# Patient Record
Sex: FEMALE | Race: WHITE | HISPANIC OR LATINO | Employment: OTHER | URBAN - METROPOLITAN AREA
[De-identification: names, ages, dates, MRNs, and addresses within clinical notes are randomized per-mention and may not be internally consistent; named-entity substitution may affect disease eponyms.]

---

## 2024-08-14 ENCOUNTER — HOSPITAL ENCOUNTER (EMERGENCY)
Facility: HOSPITAL | Age: 79
Discharge: HOME | End: 2024-08-14
Attending: STUDENT IN AN ORGANIZED HEALTH CARE EDUCATION/TRAINING PROGRAM
Payer: MEDICARE

## 2024-08-14 ENCOUNTER — APPOINTMENT (OUTPATIENT)
Dept: CARDIOLOGY | Facility: HOSPITAL | Age: 79
End: 2024-08-14
Payer: MEDICARE

## 2024-08-14 ENCOUNTER — APPOINTMENT (OUTPATIENT)
Dept: RADIOLOGY | Facility: HOSPITAL | Age: 79
End: 2024-08-14
Payer: MEDICARE

## 2024-08-14 VITALS
DIASTOLIC BLOOD PRESSURE: 66 MMHG | RESPIRATION RATE: 18 BRPM | WEIGHT: 131 LBS | OXYGEN SATURATION: 97 % | HEIGHT: 62 IN | BODY MASS INDEX: 24.11 KG/M2 | HEART RATE: 90 BPM | SYSTOLIC BLOOD PRESSURE: 129 MMHG | TEMPERATURE: 98.3 F

## 2024-08-14 DIAGNOSIS — Z04.1 EXAM FOLLOWING MVC (MOTOR VEHICLE COLLISION), NO APPARENT INJURY: Primary | ICD-10-CM

## 2024-08-14 LAB
ALBUMIN SERPL BCP-MCNC: 3.9 G/DL (ref 3.4–5)
ALP SERPL-CCNC: 104 U/L (ref 33–136)
ALT SERPL W P-5'-P-CCNC: 19 U/L (ref 7–45)
ANION GAP SERPL CALC-SCNC: 14 MMOL/L (ref 10–20)
AST SERPL W P-5'-P-CCNC: 22 U/L (ref 9–39)
BASOPHILS # BLD AUTO: 0.05 X10*3/UL (ref 0–0.1)
BASOPHILS NFR BLD AUTO: 0.7 %
BILIRUB SERPL-MCNC: 0.6 MG/DL (ref 0–1.2)
BUN SERPL-MCNC: 25 MG/DL (ref 6–23)
CALCIUM SERPL-MCNC: 9.1 MG/DL (ref 8.6–10.3)
CARDIAC TROPONIN I PNL SERPL HS: 5 NG/L (ref 0–13)
CHLORIDE SERPL-SCNC: 105 MMOL/L (ref 98–107)
CO2 SERPL-SCNC: 25 MMOL/L (ref 21–32)
CREAT SERPL-MCNC: 1.19 MG/DL (ref 0.5–1.05)
EGFRCR SERPLBLD CKD-EPI 2021: 47 ML/MIN/1.73M*2
EOSINOPHIL # BLD AUTO: 0.22 X10*3/UL (ref 0–0.4)
EOSINOPHIL NFR BLD AUTO: 3 %
ERYTHROCYTE [DISTWIDTH] IN BLOOD BY AUTOMATED COUNT: 12.4 % (ref 11.5–14.5)
GLUCOSE SERPL-MCNC: 215 MG/DL (ref 74–99)
HCT VFR BLD AUTO: 40.7 % (ref 36–46)
HGB BLD-MCNC: 13.9 G/DL (ref 12–16)
IMM GRANULOCYTES # BLD AUTO: 0.04 X10*3/UL (ref 0–0.5)
IMM GRANULOCYTES NFR BLD AUTO: 0.6 % (ref 0–0.9)
LYMPHOCYTES # BLD AUTO: 2.57 X10*3/UL (ref 0.8–3)
LYMPHOCYTES NFR BLD AUTO: 35.4 %
MCH RBC QN AUTO: 29.6 PG (ref 26–34)
MCHC RBC AUTO-ENTMCNC: 34.2 G/DL (ref 32–36)
MCV RBC AUTO: 87 FL (ref 80–100)
MONOCYTES # BLD AUTO: 0.56 X10*3/UL (ref 0.05–0.8)
MONOCYTES NFR BLD AUTO: 7.7 %
NEUTROPHILS # BLD AUTO: 3.81 X10*3/UL (ref 1.6–5.5)
NEUTROPHILS NFR BLD AUTO: 52.6 %
NRBC BLD-RTO: 0 /100 WBCS (ref 0–0)
PLATELET # BLD AUTO: 237 X10*3/UL (ref 150–450)
POTASSIUM SERPL-SCNC: 3.9 MMOL/L (ref 3.5–5.3)
PROT SERPL-MCNC: 6.6 G/DL (ref 6.4–8.2)
RBC # BLD AUTO: 4.69 X10*6/UL (ref 4–5.2)
SODIUM SERPL-SCNC: 140 MMOL/L (ref 136–145)
WBC # BLD AUTO: 7.3 X10*3/UL (ref 4.4–11.3)

## 2024-08-14 PROCEDURE — 80053 COMPREHEN METABOLIC PANEL: CPT | Performed by: STUDENT IN AN ORGANIZED HEALTH CARE EDUCATION/TRAINING PROGRAM

## 2024-08-14 PROCEDURE — 84484 ASSAY OF TROPONIN QUANT: CPT | Performed by: STUDENT IN AN ORGANIZED HEALTH CARE EDUCATION/TRAINING PROGRAM

## 2024-08-14 PROCEDURE — 72131 CT LUMBAR SPINE W/O DYE: CPT | Mod: RCN

## 2024-08-14 PROCEDURE — 71045 X-RAY EXAM CHEST 1 VIEW: CPT | Performed by: RADIOLOGY

## 2024-08-14 PROCEDURE — 85025 COMPLETE CBC W/AUTO DIFF WBC: CPT | Performed by: STUDENT IN AN ORGANIZED HEALTH CARE EDUCATION/TRAINING PROGRAM

## 2024-08-14 PROCEDURE — 72125 CT NECK SPINE W/O DYE: CPT

## 2024-08-14 PROCEDURE — 72128 CT CHEST SPINE W/O DYE: CPT | Mod: RCN

## 2024-08-14 PROCEDURE — 93005 ELECTROCARDIOGRAM TRACING: CPT

## 2024-08-14 PROCEDURE — 74176 CT ABD & PELVIS W/O CONTRAST: CPT

## 2024-08-14 PROCEDURE — 36415 COLL VENOUS BLD VENIPUNCTURE: CPT | Performed by: STUDENT IN AN ORGANIZED HEALTH CARE EDUCATION/TRAINING PROGRAM

## 2024-08-14 PROCEDURE — 99285 EMERGENCY DEPT VISIT HI MDM: CPT | Mod: 25

## 2024-08-14 PROCEDURE — 70450 CT HEAD/BRAIN W/O DYE: CPT

## 2024-08-14 PROCEDURE — 71045 X-RAY EXAM CHEST 1 VIEW: CPT

## 2024-08-14 RX ORDER — ACETAMINOPHEN 325 MG/1
975 TABLET ORAL ONCE
Status: COMPLETED | OUTPATIENT
Start: 2024-08-14 | End: 2024-08-14

## 2024-08-14 ASSESSMENT — LIFESTYLE VARIABLES
EVER FELT BAD OR GUILTY ABOUT YOUR DRINKING: NO
TOTAL SCORE: 0
HAVE PEOPLE ANNOYED YOU BY CRITICIZING YOUR DRINKING: NO
HAVE YOU EVER FELT YOU SHOULD CUT DOWN ON YOUR DRINKING: NO
EVER HAD A DRINK FIRST THING IN THE MORNING TO STEADY YOUR NERVES TO GET RID OF A HANGOVER: NO

## 2024-08-14 ASSESSMENT — COLUMBIA-SUICIDE SEVERITY RATING SCALE - C-SSRS
6. HAVE YOU EVER DONE ANYTHING, STARTED TO DO ANYTHING, OR PREPARED TO DO ANYTHING TO END YOUR LIFE?: NO
2. HAVE YOU ACTUALLY HAD ANY THOUGHTS OF KILLING YOURSELF?: NO
1. IN THE PAST MONTH, HAVE YOU WISHED YOU WERE DEAD OR WISHED YOU COULD GO TO SLEEP AND NOT WAKE UP?: NO

## 2024-08-14 ASSESSMENT — PAIN SCALES - GENERAL: PAINLEVEL_OUTOF10: 10 - WORST POSSIBLE PAIN

## 2024-08-14 ASSESSMENT — PAIN - FUNCTIONAL ASSESSMENT: PAIN_FUNCTIONAL_ASSESSMENT: 0-10
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
## 2024-08-15 LAB
ATRIAL RATE: 84 BPM
P AXIS: 19 DEGREES
P OFFSET: 174 MS
P ONSET: 136 MS
PR INTERVAL: 154 MS
Q ONSET: 213 MS
QRS COUNT: 14 BEATS
QRS DURATION: 116 MS
QT INTERVAL: 420 MS
QTC CALCULATION(BAZETT): 496 MS
QTC FREDERICIA: 469 MS
R AXIS: -70 DEGREES
T AXIS: 35 DEGREES
T OFFSET: 423 MS
VENTRICULAR RATE: 84 BPM

## 2024-09-03 ENCOUNTER — APPOINTMENT (OUTPATIENT)
Dept: PRIMARY CARE | Facility: CLINIC | Age: 79
End: 2024-09-03
Payer: MEDICARE

## 2024-09-04 ENCOUNTER — APPOINTMENT (OUTPATIENT)
Dept: PRIMARY CARE | Facility: CLINIC | Age: 79
End: 2024-09-04
Payer: MEDICARE

## 2024-09-04 VITALS
DIASTOLIC BLOOD PRESSURE: 74 MMHG | RESPIRATION RATE: 16 BRPM | OXYGEN SATURATION: 92 % | SYSTOLIC BLOOD PRESSURE: 126 MMHG | HEART RATE: 56 BPM | BODY MASS INDEX: 24.22 KG/M2 | HEIGHT: 62 IN | TEMPERATURE: 98 F | WEIGHT: 131.6 LBS

## 2024-09-04 DIAGNOSIS — I73.9 CLAUDICATION OF BOTH LOWER EXTREMITIES (CMS-HCC): ICD-10-CM

## 2024-09-04 DIAGNOSIS — I10 PRIMARY HYPERTENSION: ICD-10-CM

## 2024-09-04 DIAGNOSIS — Z00.00 ROUTINE GENERAL MEDICAL EXAMINATION AT HEALTH CARE FACILITY: ICD-10-CM

## 2024-09-04 DIAGNOSIS — Z78.0 MENOPAUSE: ICD-10-CM

## 2024-09-04 DIAGNOSIS — E03.8 OTHER SPECIFIED HYPOTHYROIDISM: ICD-10-CM

## 2024-09-04 DIAGNOSIS — N18.31 STAGE 3A CHRONIC KIDNEY DISEASE (MULTI): ICD-10-CM

## 2024-09-04 DIAGNOSIS — Z00.00 ENCOUNTER FOR SUBSEQUENT ANNUAL WELLNESS VISIT IN MEDICARE PATIENT: ICD-10-CM

## 2024-09-04 DIAGNOSIS — K21.00 GASTROESOPHAGEAL REFLUX DISEASE WITH ESOPHAGITIS, UNSPECIFIED WHETHER HEMORRHAGE: ICD-10-CM

## 2024-09-04 DIAGNOSIS — E78.2 MIXED HYPERLIPIDEMIA: ICD-10-CM

## 2024-09-04 DIAGNOSIS — E11.9 TYPE 2 DIABETES MELLITUS WITHOUT COMPLICATION, WITHOUT LONG-TERM CURRENT USE OF INSULIN (MULTI): Primary | ICD-10-CM

## 2024-09-04 PROCEDURE — 1036F TOBACCO NON-USER: CPT | Performed by: FAMILY MEDICINE

## 2024-09-04 PROCEDURE — 99204 OFFICE O/P NEW MOD 45 MIN: CPT | Performed by: FAMILY MEDICINE

## 2024-09-04 PROCEDURE — 1123F ACP DISCUSS/DSCN MKR DOCD: CPT | Performed by: FAMILY MEDICINE

## 2024-09-04 PROCEDURE — G0439 PPPS, SUBSEQ VISIT: HCPCS | Performed by: FAMILY MEDICINE

## 2024-09-04 PROCEDURE — 3078F DIAST BP <80 MM HG: CPT | Performed by: FAMILY MEDICINE

## 2024-09-04 PROCEDURE — 1158F ADVNC CARE PLAN TLK DOCD: CPT | Performed by: FAMILY MEDICINE

## 2024-09-04 PROCEDURE — 3074F SYST BP LT 130 MM HG: CPT | Performed by: FAMILY MEDICINE

## 2024-09-04 PROCEDURE — 1170F FXNL STATUS ASSESSED: CPT | Performed by: FAMILY MEDICINE

## 2024-09-04 PROCEDURE — 1159F MED LIST DOCD IN RCRD: CPT | Performed by: FAMILY MEDICINE

## 2024-09-04 RX ORDER — LEVOTHYROXINE SODIUM 50 UG/1
50 TABLET ORAL DAILY
COMMUNITY
Start: 2024-06-21 | End: 2024-09-04 | Stop reason: SDUPTHER

## 2024-09-04 RX ORDER — FAMOTIDINE 40 MG/1
1 TABLET, FILM COATED ORAL DAILY
COMMUNITY
Start: 2024-06-21 | End: 2024-09-04 | Stop reason: SDUPTHER

## 2024-09-04 RX ORDER — TRIAMTERENE AND HYDROCHLOROTHIAZIDE 37.5; 25 MG/1; MG/1
1 CAPSULE ORAL
Qty: 90 CAPSULE | Refills: 3 | Status: SHIPPED | OUTPATIENT
Start: 2024-09-04

## 2024-09-04 RX ORDER — LEVOTHYROXINE SODIUM 50 UG/1
50 TABLET ORAL DAILY
Qty: 90 TABLET | Refills: 3 | Status: SHIPPED | OUTPATIENT
Start: 2024-09-04

## 2024-09-04 RX ORDER — ATORVASTATIN CALCIUM 40 MG/1
40 TABLET, FILM COATED ORAL
COMMUNITY
Start: 2024-06-21 | End: 2024-09-04 | Stop reason: SDUPTHER

## 2024-09-04 RX ORDER — FAMOTIDINE 40 MG/1
40 TABLET, FILM COATED ORAL DAILY
Qty: 90 TABLET | Refills: 3 | Status: SHIPPED | OUTPATIENT
Start: 2024-09-04

## 2024-09-04 RX ORDER — TRIAMTERENE AND HYDROCHLOROTHIAZIDE 37.5; 25 MG/1; MG/1
1 CAPSULE ORAL
COMMUNITY
Start: 2024-06-21 | End: 2024-09-04 | Stop reason: SDUPTHER

## 2024-09-04 RX ORDER — GLIMEPIRIDE 4 MG/1
4 TABLET ORAL
Qty: 90 TABLET | Refills: 3 | Status: SHIPPED | OUTPATIENT
Start: 2024-09-04 | End: 2025-08-30

## 2024-09-04 RX ORDER — ATORVASTATIN CALCIUM 40 MG/1
40 TABLET, FILM COATED ORAL
Qty: 90 TABLET | Refills: 3 | Status: SHIPPED | OUTPATIENT
Start: 2024-09-04

## 2024-09-04 ASSESSMENT — ACTIVITIES OF DAILY LIVING (ADL)
GROCERY_SHOPPING: INDEPENDENT
MANAGING_FINANCES: INDEPENDENT
BATHING: INDEPENDENT
DOING_HOUSEWORK: INDEPENDENT
TAKING_MEDICATION: INDEPENDENT
DRESSING: INDEPENDENT

## 2024-09-04 ASSESSMENT — PATIENT HEALTH QUESTIONNAIRE - PHQ9
2. FEELING DOWN, DEPRESSED OR HOPELESS: NOT AT ALL
1. LITTLE INTEREST OR PLEASURE IN DOING THINGS: NOT AT ALL
SUM OF ALL RESPONSES TO PHQ9 QUESTIONS 1 AND 2: 0

## 2024-09-04 ASSESSMENT — ENCOUNTER SYMPTOMS
LOSS OF SENSATION IN FEET: 0
OCCASIONAL FEELINGS OF UNSTEADINESS: 0
DEPRESSION: 0

## 2024-09-04 NOTE — PROGRESS NOTES
"Subjective    Patient ID: Akanksha Ramirez is a 78 y.o. female who presents for Medicare Annual Wellness Visit Subsequent, Diabetes (Pt would like to try Glipezide or Glyburide ), Hypertension, Hyperlipidemia, Hypothyroidism, Leg Pain (Bilateral in the back of her legs. Pt takes otc Ibuprofen), and Headache (Pain on the right side of her head only.).     Diabetes: HbA1C was 8.0 back in June 2024. Patient is currently not on any diabetic medication. She had an adverse reaction to a previous medication, which led to a 3-day hospital admission. Will prescribe glimepiride 4mg 1 tablet in the morning and continue to monitor blood sugars at home. Home blood sugars range between 212-282.     Hypertension: Blood pressure reading today is normal.     Hyperlipidemia: Recommended to continue atorvastatin.     Leg pain: Patient complaints of bilateral leg pain which worsens with activity.  It improves with rest.  She also takes ibuprofen for this. She reports having varicose veins. A referral to vascular specialist will be placed.      The patients living will is non-active. Her POA is daughter, back-up POA is Unknown at this time.  The patients current code status is DNR. The patient does not want to linger on machines. .    The patient denies having the following symptoms: chest pain, chest pressure, fever, chills, N/V/D, constipation, dizziness, headaches, SOB.    Objective   Vitals:  /74   Pulse 56   Temp 36.7 °C (98 °F)   Resp 16   Ht 1.575 m (5' 2\")   Wt 59.7 kg (131 lb 9.6 oz)   SpO2 92%   BMI 24.07 kg/m²     Physical Exam  Vitals reviewed.   Constitutional:       Appearance: Normal appearance.   Neck:      Vascular: No carotid bruit.   Cardiovascular:      Rate and Rhythm: Normal rate and regular rhythm.      Heart sounds: Normal heart sounds.      Comments: Radial pulses are strong and symmetric. Ankle pulses markedly diminished.   Pulmonary:      Effort: Pulmonary effort is normal. No respiratory " distress.      Breath sounds: Normal breath sounds. No wheezing.   Abdominal:      General: There is no distension.      Palpations: Abdomen is soft. There is no mass.      Tenderness: There is no abdominal tenderness. There is no right CVA tenderness, left CVA tenderness, guarding or rebound.   Musculoskeletal:      Cervical back: Normal range of motion and neck supple. No rigidity.      Right lower leg: No edema.      Left lower leg: No edema.   Lymphadenopathy:      Cervical: No cervical adenopathy.   Neurological:      Mental Status: She is alert.            Labs reviewed from :   8/14/24      CMP, CBC.       Assessment/Plan   Problem List Items Addressed This Visit       Stage 3a chronic kidney disease (Multi)     This condition is clinically stable so we will continue with current medications and lab work to confirm status ordered.         Primary hypertension     This condition is well controlled, continue with current medications.         Relevant Medications    triamterene-hydrochlorothiazid (Dyazide) 37.5-25 mg capsule    Other Relevant Orders    CBC and Auto Differential    Comprehensive Metabolic Panel    Other specified hypothyroidism     This condition is stable, continue with current treatment.         Relevant Medications    levothyroxine (Synthroid, Levoxyl) 50 mcg tablet    Other Relevant Orders    TSH with reflex to Free T4 if abnormal    Mixed hyperlipidemia     This condition is clinically stable so we will continue with current medications and lab work to confirm status ordered.         Relevant Medications    atorvastatin (Lipitor) 40 mg tablet    Other Relevant Orders    Lipid Panel    Gastroesophageal reflux disease with esophagitis     This condition is stable, continue with current treatment.         Relevant Medications    famotidine (Pepcid) 40 mg tablet    Type 2 diabetes mellitus without complication, without long-term current use of insulin (Multi) - Primary     This condition is  clinically stable so we will continue with current medications and lab work to confirm status ordered.         Relevant Medications    glimepiride (AmaryL) 4 mg tablet    Other Relevant Orders    Hemoglobin A1C    Follow Up In [facility] - PCP - Established    Menopause     Present, due for bone densitometry.         Relevant Orders    XR DEXA bone density    Claudication of both lower extremities (CMS-HCC)     Symptomatic, refer for vascular surgery evaluation.         Relevant Orders    Referral to Vascular Surgery    Encounter for subsequent annual wellness visit in Medicare patient     Medicare wellness visit done, patient is in satisfactory living circumstances where the patient's needs are met.  This patient is advised to develop or update their living will and provide us a copy for the chart.          Other Visit Diagnoses       Routine general medical examination at health care facility                Follow up in: 3 month(s) or sooner if needed with labs prior.     Scribe Attestation  By signing my name below, IMarcela , Scribyamila attest that this documentation has been prepared under the direction and in the presence of Artie Mccallum MD.

## 2024-09-18 ENCOUNTER — HOSPITAL ENCOUNTER (OUTPATIENT)
Dept: RADIOLOGY | Facility: HOSPITAL | Age: 79
Discharge: HOME | End: 2024-09-18
Payer: MEDICARE

## 2024-09-18 DIAGNOSIS — Z78.0 MENOPAUSE: ICD-10-CM

## 2024-09-18 PROCEDURE — 77080 DXA BONE DENSITY AXIAL: CPT | Performed by: RADIOLOGY

## 2024-09-18 PROCEDURE — 77080 DXA BONE DENSITY AXIAL: CPT

## 2024-11-27 ENCOUNTER — OFFICE VISIT (OUTPATIENT)
Dept: VASCULAR SURGERY | Facility: CLINIC | Age: 79
End: 2024-11-27
Payer: MEDICARE

## 2024-11-27 VITALS
OXYGEN SATURATION: 97 % | BODY MASS INDEX: 24.84 KG/M2 | HEIGHT: 62 IN | DIASTOLIC BLOOD PRESSURE: 80 MMHG | WEIGHT: 135 LBS | SYSTOLIC BLOOD PRESSURE: 128 MMHG | HEART RATE: 78 BPM

## 2024-11-27 DIAGNOSIS — I73.9 CLAUDICATION OF BOTH LOWER EXTREMITIES (CMS-HCC): ICD-10-CM

## 2024-11-27 DIAGNOSIS — I73.9 PAD (PERIPHERAL ARTERY DISEASE) (CMS-HCC): Primary | ICD-10-CM

## 2024-11-27 PROCEDURE — 99214 OFFICE O/P EST MOD 30 MIN: CPT | Performed by: SURGERY

## 2024-11-27 PROCEDURE — 3074F SYST BP LT 130 MM HG: CPT | Performed by: SURGERY

## 2024-11-27 PROCEDURE — 1123F ACP DISCUSS/DSCN MKR DOCD: CPT | Performed by: SURGERY

## 2024-11-27 PROCEDURE — 1160F RVW MEDS BY RX/DR IN RCRD: CPT | Performed by: SURGERY

## 2024-11-27 PROCEDURE — 1159F MED LIST DOCD IN RCRD: CPT | Performed by: SURGERY

## 2024-11-27 PROCEDURE — 3079F DIAST BP 80-89 MM HG: CPT | Performed by: SURGERY

## 2024-11-27 PROCEDURE — 99204 OFFICE O/P NEW MOD 45 MIN: CPT | Performed by: SURGERY

## 2024-11-27 RX ORDER — ASPIRIN 81 MG/1
81 TABLET ORAL DAILY
COMMUNITY

## 2024-11-28 ASSESSMENT — ENCOUNTER SYMPTOMS
GASTROINTESTINAL NEGATIVE: 1
COLOR CHANGE: 0
SHORTNESS OF BREATH: 0
DIZZINESS: 0
BRUISES/BLEEDS EASILY: 0
COUGH: 0
WOUND: 0
WEAKNESS: 0
HEADACHES: 0
CONSTITUTIONAL NEGATIVE: 1
BACK PAIN: 0
NUMBNESS: 0
SPEECH DIFFICULTY: 0
ENDOCRINE NEGATIVE: 1
PSYCHIATRIC NEGATIVE: 1
EYES NEGATIVE: 1

## 2024-11-28 NOTE — PROGRESS NOTES
History Of Present Illness  Akanksha Ramirez is a 79 y.o. female presenting for PAD evaluation.  She reports cramping and aching in her calves after walking about a half a mile that causes her to stop and rest.  She states this has been an issue for over a year.  She feels the distance has decreased since it initially started.  She reports occasional cramping in the legs at night, but denies any rest pain.  She denies any sores or ulcers.  She has never been a smoker.  Other medical conditions include type 2 diabetes, hyperlipidemia, hypertension, and history of Bell's palsy.     Past Medical History  She has no past medical history on file.    Surgical History  She has no past surgical history on file.     Social History  She reports that she has never smoked. She has never used smokeless tobacco. She reports that she does not drink alcohol and does not use drugs.    Family History  No family history on file.     Allergies  Iodine    Review of Systems   Constitutional: Negative.    HENT: Negative.     Eyes: Negative.    Respiratory:  Negative for cough and shortness of breath.    Cardiovascular:  Negative for chest pain and leg swelling.   Gastrointestinal: Negative.    Endocrine: Negative.    Genitourinary: Negative.    Musculoskeletal:  Negative for back pain and gait problem.   Skin:  Negative for color change, pallor and wound.   Neurological:  Negative for dizziness, syncope, speech difficulty, weakness, numbness and headaches.   Hematological:  Does not bruise/bleed easily.   Psychiatric/Behavioral: Negative.          Physical Exam  Constitutional:       General: She is not in acute distress.     Appearance: Normal appearance. She is normal weight.   HENT:      Head: Normocephalic and atraumatic.   Eyes:      Extraocular Movements: Extraocular movements intact.      Conjunctiva/sclera: Conjunctivae normal.   Cardiovascular:      Rate and Rhythm: Normal rate and regular rhythm.      Pulses:            "Femoral pulses are 2+ on the right side and 2+ on the left side.       Popliteal pulses are 0 on the right side and 2+ on the left side.        Dorsalis pedis pulses are detected w/ Doppler on the right side and 1+ on the left side.        Posterior tibial pulses are detected w/ Doppler on the right side and detected w/ Doppler on the left side.      Heart sounds: Normal heart sounds.   Pulmonary:      Effort: Pulmonary effort is normal.      Breath sounds: Normal breath sounds.   Abdominal:      General: Abdomen is flat.      Palpations: Abdomen is soft.   Musculoskeletal:         General: No swelling. Normal range of motion.      Cervical back: Normal range of motion.      Right lower leg: No edema.      Left lower leg: No edema.   Skin:     General: Skin is warm and dry.   Neurological:      General: No focal deficit present.      Mental Status: She is alert and oriented to person, place, and time.      Cranial Nerves: No cranial nerve deficit.      Sensory: No sensory deficit.      Motor: No weakness.   Psychiatric:         Mood and Affect: Mood normal.         Behavior: Behavior normal.          Last Recorded Vitals  Blood pressure 128/80, pulse 78, height 1.575 m (5' 2\"), weight 61.2 kg (135 lb), SpO2 97%.    Relevant Results      Current Outpatient Medications:     atorvastatin (Lipitor) 40 mg tablet, Take 1 tablet (40 mg) by mouth once daily., Disp: 90 tablet, Rfl: 3    famotidine (Pepcid) 40 mg tablet, Take 1 tablet (40 mg) by mouth once daily., Disp: 90 tablet, Rfl: 3    glimepiride (AmaryL) 4 mg tablet, Take 1 tablet (4 mg) by mouth once daily in the morning. Take before meals., Disp: 90 tablet, Rfl: 3    levothyroxine (Synthroid, Levoxyl) 50 mcg tablet, Take 1 tablet (50 mcg) by mouth early in the morning.., Disp: 90 tablet, Rfl: 3    triamterene-hydrochlorothiazid (Dyazide) 37.5-25 mg capsule, Take 1 capsule by mouth once daily., Disp: 90 capsule, Rfl: 3    aspirin 81 mg EC tablet, Take 1 tablet (81 mg) " by mouth once daily., Disp: , Rfl:           Assessment/Plan   Diagnoses and all orders for this visit:  PAD (peripheral artery disease) (CMS-HCC)  -     Vascular US Ankle Brachial Index (LASHAWN) Without Exercise; Future  Claudication of both lower extremities (CMS-HCC)  -     Referral to Vascular Surgery  -     Vascular US Ankle Brachial Index (LASHAWN) Without Exercise; Future      80yo female with symptoms consistent with calf claudication.  On exam she does have an abnormal pedal pulse exam.  She likely does have PAD.  Will send her for an LASHAWN study for further evaluation.  I recommended that she start medical management for now.  She is currently on atorvastatin 40 mg she states she was previously taking an aspirin a day, but stopped after she was told it was not necessary.  I have recommended that she start taking aspirin 81 mg daily again for management of PAD.  I have also encouraged her to continue with daily walking exercise.  She is to follow-up in 1 month to discuss results of her LASHAWN study and any further recommendations.       (This note was generated with voice recognition software and may contain errors including spelling, grammar, syntax and missed recognition of what was dictated, of which may not have been fully corrected)      Ene Hernandez MD

## 2024-11-29 ENCOUNTER — LAB (OUTPATIENT)
Dept: LAB | Facility: LAB | Age: 79
End: 2024-11-29
Payer: MEDICARE

## 2024-11-29 DIAGNOSIS — E78.2 MIXED HYPERLIPIDEMIA: ICD-10-CM

## 2024-11-29 DIAGNOSIS — E03.8 OTHER SPECIFIED HYPOTHYROIDISM: ICD-10-CM

## 2024-11-29 DIAGNOSIS — E11.9 TYPE 2 DIABETES MELLITUS WITHOUT COMPLICATION, WITHOUT LONG-TERM CURRENT USE OF INSULIN (MULTI): ICD-10-CM

## 2024-11-29 DIAGNOSIS — I10 PRIMARY HYPERTENSION: ICD-10-CM

## 2024-11-29 LAB
ALBUMIN SERPL BCP-MCNC: 4.1 G/DL (ref 3.4–5)
ALP SERPL-CCNC: 103 U/L (ref 33–136)
ALT SERPL W P-5'-P-CCNC: 22 U/L (ref 7–45)
ANION GAP SERPL CALC-SCNC: 10 MMOL/L (ref 10–20)
AST SERPL W P-5'-P-CCNC: 22 U/L (ref 9–39)
BASOPHILS # BLD AUTO: 0.07 X10*3/UL (ref 0–0.1)
BASOPHILS NFR BLD AUTO: 0.9 %
BILIRUB SERPL-MCNC: 0.8 MG/DL (ref 0–1.2)
BUN SERPL-MCNC: 34 MG/DL (ref 6–23)
CALCIUM SERPL-MCNC: 9.4 MG/DL (ref 8.6–10.3)
CHLORIDE SERPL-SCNC: 106 MMOL/L (ref 98–107)
CHOLEST SERPL-MCNC: 156 MG/DL (ref 0–199)
CHOLESTEROL/HDL RATIO: 3.2
CO2 SERPL-SCNC: 30 MMOL/L (ref 21–32)
CREAT SERPL-MCNC: 1.26 MG/DL (ref 0.5–1.05)
EGFRCR SERPLBLD CKD-EPI 2021: 44 ML/MIN/1.73M*2
EOSINOPHIL # BLD AUTO: 0.24 X10*3/UL (ref 0–0.4)
EOSINOPHIL NFR BLD AUTO: 3.1 %
ERYTHROCYTE [DISTWIDTH] IN BLOOD BY AUTOMATED COUNT: 12.4 % (ref 11.5–14.5)
EST. AVERAGE GLUCOSE BLD GHB EST-MCNC: 166 MG/DL
GLUCOSE SERPL-MCNC: 162 MG/DL (ref 74–99)
HBA1C MFR BLD: 7.4 %
HCT VFR BLD AUTO: 40.7 % (ref 36–46)
HDLC SERPL-MCNC: 48.1 MG/DL
HGB BLD-MCNC: 13.6 G/DL (ref 12–16)
IMM GRANULOCYTES # BLD AUTO: 0.08 X10*3/UL (ref 0–0.5)
IMM GRANULOCYTES NFR BLD AUTO: 1 % (ref 0–0.9)
LDLC SERPL CALC-MCNC: 77 MG/DL
LYMPHOCYTES # BLD AUTO: 1.79 X10*3/UL (ref 0.8–3)
LYMPHOCYTES NFR BLD AUTO: 22.8 %
MCH RBC QN AUTO: 29.4 PG (ref 26–34)
MCHC RBC AUTO-ENTMCNC: 33.4 G/DL (ref 32–36)
MCV RBC AUTO: 88 FL (ref 80–100)
MONOCYTES # BLD AUTO: 0.59 X10*3/UL (ref 0.05–0.8)
MONOCYTES NFR BLD AUTO: 7.5 %
NEUTROPHILS # BLD AUTO: 5.09 X10*3/UL (ref 1.6–5.5)
NEUTROPHILS NFR BLD AUTO: 64.7 %
NON HDL CHOLESTEROL: 108 MG/DL (ref 0–149)
NRBC BLD-RTO: 0 /100 WBCS (ref 0–0)
PLATELET # BLD AUTO: 250 X10*3/UL (ref 150–450)
POTASSIUM SERPL-SCNC: 4 MMOL/L (ref 3.5–5.3)
PROT SERPL-MCNC: 6.3 G/DL (ref 6.4–8.2)
RBC # BLD AUTO: 4.63 X10*6/UL (ref 4–5.2)
SODIUM SERPL-SCNC: 142 MMOL/L (ref 136–145)
TRIGL SERPL-MCNC: 153 MG/DL (ref 0–149)
TSH SERPL-ACNC: 3.18 MIU/L (ref 0.44–3.98)
VLDL: 31 MG/DL (ref 0–40)
WBC # BLD AUTO: 7.9 X10*3/UL (ref 4.4–11.3)

## 2024-11-29 PROCEDURE — 36415 COLL VENOUS BLD VENIPUNCTURE: CPT

## 2024-11-29 PROCEDURE — 85025 COMPLETE CBC W/AUTO DIFF WBC: CPT

## 2024-11-29 PROCEDURE — 83036 HEMOGLOBIN GLYCOSYLATED A1C: CPT

## 2024-11-29 PROCEDURE — 80061 LIPID PANEL: CPT

## 2024-11-29 PROCEDURE — 84443 ASSAY THYROID STIM HORMONE: CPT

## 2024-11-29 PROCEDURE — 80053 COMPREHEN METABOLIC PANEL: CPT

## 2024-12-04 ENCOUNTER — APPOINTMENT (OUTPATIENT)
Dept: PRIMARY CARE | Facility: CLINIC | Age: 79
End: 2024-12-04
Payer: MEDICARE

## 2024-12-04 VITALS
HEART RATE: 78 BPM | WEIGHT: 141.6 LBS | RESPIRATION RATE: 16 BRPM | TEMPERATURE: 98.2 F | OXYGEN SATURATION: 99 % | DIASTOLIC BLOOD PRESSURE: 66 MMHG | BODY MASS INDEX: 26.06 KG/M2 | SYSTOLIC BLOOD PRESSURE: 124 MMHG | HEIGHT: 62 IN

## 2024-12-04 DIAGNOSIS — N18.31 STAGE 3A CHRONIC KIDNEY DISEASE (MULTI): ICD-10-CM

## 2024-12-04 DIAGNOSIS — E03.8 OTHER SPECIFIED HYPOTHYROIDISM: ICD-10-CM

## 2024-12-04 DIAGNOSIS — I73.9 PAD (PERIPHERAL ARTERY DISEASE) (CMS-HCC): ICD-10-CM

## 2024-12-04 DIAGNOSIS — E78.2 MIXED HYPERLIPIDEMIA: ICD-10-CM

## 2024-12-04 DIAGNOSIS — I10 PRIMARY HYPERTENSION: Primary | ICD-10-CM

## 2024-12-04 DIAGNOSIS — E11.9 TYPE 2 DIABETES MELLITUS WITHOUT COMPLICATION, WITHOUT LONG-TERM CURRENT USE OF INSULIN (MULTI): ICD-10-CM

## 2024-12-04 PROCEDURE — 1159F MED LIST DOCD IN RCRD: CPT | Performed by: FAMILY MEDICINE

## 2024-12-04 PROCEDURE — 1123F ACP DISCUSS/DSCN MKR DOCD: CPT | Performed by: FAMILY MEDICINE

## 2024-12-04 PROCEDURE — 3074F SYST BP LT 130 MM HG: CPT | Performed by: FAMILY MEDICINE

## 2024-12-04 PROCEDURE — 3078F DIAST BP <80 MM HG: CPT | Performed by: FAMILY MEDICINE

## 2024-12-04 PROCEDURE — G2211 COMPLEX E/M VISIT ADD ON: HCPCS | Performed by: FAMILY MEDICINE

## 2024-12-04 PROCEDURE — 1036F TOBACCO NON-USER: CPT | Performed by: FAMILY MEDICINE

## 2024-12-04 PROCEDURE — 99214 OFFICE O/P EST MOD 30 MIN: CPT | Performed by: FAMILY MEDICINE

## 2024-12-04 PROCEDURE — 1160F RVW MEDS BY RX/DR IN RCRD: CPT | Performed by: FAMILY MEDICINE

## 2024-12-04 ASSESSMENT — PATIENT HEALTH QUESTIONNAIRE - PHQ9
1. LITTLE INTEREST OR PLEASURE IN DOING THINGS: NOT AT ALL
SUM OF ALL RESPONSES TO PHQ9 QUESTIONS 1 AND 2: 0
2. FEELING DOWN, DEPRESSED OR HOPELESS: NOT AT ALL

## 2024-12-04 ASSESSMENT — ENCOUNTER SYMPTOMS
DEPRESSION: 0
LOSS OF SENSATION IN FEET: 0
OCCASIONAL FEELINGS OF UNSTEADINESS: 0

## 2024-12-04 NOTE — PROGRESS NOTES
"Subjective   Patient ID:  Akanksha Ramirez is a 79 y.o. female patient who presents today for Diabetes, Hypertension, Hyperlipidemia, Hypothyroidism, Chronic Kidney Disease (Stage 3A), and Numbness (Toes on right foot).    Diabetes: Most recent blood work shows blood glucose 162 mg/dL and HbA1C 7.4%. Medications well tolerated. When her blood glucose drops below 100, she feels dizzy. Otherwise, doing well. Continue current regimen.     Hypertension: Blood pressure is normal.     Hyperlipidemia: Triglycerides are slightly elevated. Otherwise, cholesterol levels are well controlled.     Hypothyroidism: TSH is normal at 3.18.     CKD 3a: eGFR dropped to 44. Recommended to stay hydrated.     Numbness: Patient reports numbness and burning sensation in her toes on her right foot, especially at night. She had an ultrasound of her leg and is currently taking Aspirin 81 mg. Patient plans to walk 30 minutes everyday on a treadmill.     Patient also reports difficulty closing her right third finger and experiencing a sensation of it being \"stuck.\" However, she states that it is not bothersome enough to see a hand specialist at this point. She plans to continue with watchful waiting.      Objective   Vitals:  /66   Pulse 78   Temp 36.8 °C (98.2 °F)   Resp 16   Ht 1.575 m (5' 2\")   Wt 64.2 kg (141 lb 9.6 oz)   SpO2 99%   BMI 25.90 kg/m²       Physical Exam  Vitals reviewed.   Constitutional:       Appearance: Normal appearance.   Neck:      Vascular: No carotid bruit.   Cardiovascular:      Rate and Rhythm: Normal rate and regular rhythm.      Heart sounds: Normal heart sounds.      Comments: Pulses abnormal. No pulse in the right leg.   Pulmonary:      Effort: Pulmonary effort is normal. No respiratory distress.      Breath sounds: Normal breath sounds. No wheezing.   Abdominal:      General: There is no distension.      Palpations: Abdomen is soft. There is no mass.      Tenderness: There is no abdominal " tenderness. There is no right CVA tenderness, left CVA tenderness, guarding or rebound.   Musculoskeletal:      Cervical back: Normal range of motion and neck supple. No rigidity.      Right lower leg: No edema.      Left lower leg: No edema.      Comments: Right third finger triggers   Lymphadenopathy:      Cervical: No cervical adenopathy.   Neurological:      Mental Status: She is alert.         Labs reviewed from:  11/29/24   CMP, CBC, Lipid, HgA1C 7.4 %       Assessment/Plan   Problem List Items Addressed This Visit       Stage 3a chronic kidney disease (Multi)    Current Assessment & Plan      Is stable, continue with current treatment.           Primary hypertension - Primary    Current Assessment & Plan      Is well controlled, continue with current medications.           Relevant Orders    CBC and Auto Differential    Comprehensive Metabolic Panel    Other specified hypothyroidism    Current Assessment & Plan      Is well controlled, continue with current medications.           Mixed hyperlipidemia    Current Assessment & Plan      Is stable, continue with current treatment.           Relevant Orders    Lipid Panel    Type 2 diabetes mellitus without complication, without long-term current use of insulin (Multi)    Current Assessment & Plan      Is stable, continue with current treatment.           Relevant Orders    Hemoglobin A1C    Albumin-Creatinine Ratio, Urine Random    Follow Up In [facility] - PCP - Established    PAD (peripheral artery disease) (CMS-[facility])    Current Assessment & Plan     Pulseless right leg, work up with vascular in progress.            Follow up in: 4 month(s) or sooner if needed with labs prior.       Scribe Attestation  By signing my name below, IMarcela , Scribe attest that this documentation has been prepared under the direction and in the presence of Artie Mccallum MD.

## 2024-12-11 ENCOUNTER — HOSPITAL ENCOUNTER (OUTPATIENT)
Dept: RADIOLOGY | Facility: HOSPITAL | Age: 79
Discharge: HOME | End: 2024-12-11
Payer: MEDICARE

## 2024-12-11 DIAGNOSIS — I73.9 PAD (PERIPHERAL ARTERY DISEASE) (CMS-HCC): ICD-10-CM

## 2024-12-11 DIAGNOSIS — I73.9 CLAUDICATION OF BOTH LOWER EXTREMITIES (CMS-HCC): ICD-10-CM

## 2024-12-11 PROCEDURE — 93922 UPR/L XTREMITY ART 2 LEVELS: CPT | Performed by: SURGERY

## 2024-12-11 PROCEDURE — 93922 UPR/L XTREMITY ART 2 LEVELS: CPT

## 2025-02-05 ENCOUNTER — APPOINTMENT (OUTPATIENT)
Dept: VASCULAR SURGERY | Facility: CLINIC | Age: 80
End: 2025-02-05
Payer: MEDICARE

## 2025-02-05 VITALS
HEART RATE: 70 BPM | BODY MASS INDEX: 25.76 KG/M2 | OXYGEN SATURATION: 95 % | HEIGHT: 62 IN | SYSTOLIC BLOOD PRESSURE: 134 MMHG | WEIGHT: 140 LBS | DIASTOLIC BLOOD PRESSURE: 76 MMHG

## 2025-02-05 DIAGNOSIS — I73.9 PAD (PERIPHERAL ARTERY DISEASE) (CMS-HCC): Primary | ICD-10-CM

## 2025-02-05 DIAGNOSIS — I73.9 CLAUDICATION OF BOTH LOWER EXTREMITIES (CMS-HCC): ICD-10-CM

## 2025-02-05 PROCEDURE — 3075F SYST BP GE 130 - 139MM HG: CPT | Performed by: SURGERY

## 2025-02-05 PROCEDURE — 3078F DIAST BP <80 MM HG: CPT | Performed by: SURGERY

## 2025-02-05 PROCEDURE — 1160F RVW MEDS BY RX/DR IN RCRD: CPT | Performed by: SURGERY

## 2025-02-05 PROCEDURE — 1123F ACP DISCUSS/DSCN MKR DOCD: CPT | Performed by: SURGERY

## 2025-02-05 PROCEDURE — 99214 OFFICE O/P EST MOD 30 MIN: CPT | Performed by: SURGERY

## 2025-02-05 PROCEDURE — 1125F AMNT PAIN NOTED PAIN PRSNT: CPT | Performed by: SURGERY

## 2025-02-05 PROCEDURE — 1159F MED LIST DOCD IN RCRD: CPT | Performed by: SURGERY

## 2025-02-05 ASSESSMENT — PAIN SCALES - GENERAL: PAINLEVEL_OUTOF10: 2

## 2025-02-05 NOTE — PROGRESS NOTES
History Of Present Illness  Akanksha Ramirez is a 79 y.o. female presenting ED follow-up.  She still reports claudication at about a half a mile.  She was recently sent for LASHAWN/PVR study.  This does reveal evidence of moderate arterial disease on the right with an LASHAWN of 0.66 and mild arterial disease on the left with an LASHAWN of 0.74.     Past Medical History  She has no past medical history on file.    Surgical History  She has no past surgical history on file.     Social History  She reports that she has never smoked. She has never used smokeless tobacco. She reports that she does not currently use alcohol. She reports that she does not use drugs.    Family History  No family history on file.     Allergies  Iodine    Review of Systems     Physical Exam  Constitutional:       General: She is not in acute distress.     Appearance: Normal appearance. She is normal weight.   HENT:      Head: Normocephalic and atraumatic.   Eyes:      Extraocular Movements: Extraocular movements intact.      Conjunctiva/sclera: Conjunctivae normal.   Cardiovascular:      Rate and Rhythm: Normal rate and regular rhythm.      Pulses:           Femoral pulses are 2+ on the right side and 2+ on the left side.       Popliteal pulses are 0 on the right side and 2+ on the left side.        Dorsalis pedis pulses are detected w/ Doppler on the right side and 1+ on the left side.        Posterior tibial pulses are detected w/ Doppler on the right side and detected w/ Doppler on the left side.      Heart sounds: Normal heart sounds.   Pulmonary:      Effort: Pulmonary effort is normal.      Breath sounds: Normal breath sounds.   Abdominal:      General: Abdomen is flat.      Palpations: Abdomen is soft.   Musculoskeletal:         General: No swelling. Normal range of motion.      Cervical back: Normal range of motion.      Right lower leg: No edema.      Left lower leg: No edema.   Skin:     General: Skin is warm and dry.   Neurological:       "General: No focal deficit present.      Mental Status: She is alert and oriented to person, place, and time.      Cranial Nerves: No cranial nerve deficit.      Sensory: No sensory deficit.      Motor: No weakness.   Psychiatric:         Mood and Affect: Mood normal.         Behavior: Behavior normal.          Last Recorded Vitals  Blood pressure 134/76, pulse 70, height 1.575 m (5' 2\"), weight 63.5 kg (140 lb), SpO2 95%.    Relevant Results      Current Outpatient Medications:     aspirin 81 mg EC tablet, Take 1 tablet (81 mg) by mouth once daily., Disp: , Rfl:     atorvastatin (Lipitor) 40 mg tablet, Take 1 tablet (40 mg) by mouth once daily., Disp: 90 tablet, Rfl: 3    famotidine (Pepcid) 40 mg tablet, Take 1 tablet (40 mg) by mouth once daily., Disp: 90 tablet, Rfl: 3    glimepiride (AmaryL) 4 mg tablet, Take 1 tablet (4 mg) by mouth once daily in the morning. Take before meals., Disp: 90 tablet, Rfl: 3    levothyroxine (Synthroid, Levoxyl) 50 mcg tablet, Take 1 tablet (50 mcg) by mouth early in the morning.., Disp: 90 tablet, Rfl: 3    triamterene-hydrochlorothiazid (Dyazide) 37.5-25 mg capsule, Take 1 capsule by mouth once daily., Disp: 90 capsule, Rfl: 3       VASC US ANKLE BRACHIAL INDEX (LASHAWN) WITHOUT EXERCISE   Patient Name:      CM Maloney Physician:  [id number] Dawson Petty MD, RPVI  Study Date:        12/11/2024           Ordering Provider:  [id number] LUIS MORTENSEN  MRN/PID:           [id number]             Fellow:  Accession#:        [id number]         Technologist:       PEDRO DONOVAN RDMS,                                                              RVT  Date of Birth/Age: 1945 / 79 years Technologist 2:  Gender:            F                    Encounter#:         [id number]  Admission Status:  Outpatient           Location Performed: [facility]" [facility]  Diagnosis/ICD: Peripheral vascular disease, unspecified-I73.9  CPT Codes:     27610 Peripheral artery LASHAWN Only   Pertinent History: HTN, Hyperlipidemia, Leg pain and PVD. Hx of stents bilat                     legs. Now having pain right calf.   **CRITICAL RESULT**  Critical Result: Moderate disease right leg  Notification called to Ene Hernandez on 12/11/2024 at 9:52:28 AM by Dorys.   CONCLUSIONS:  Right Lower PVR: Evidence of moderate arterial occlusive disease in the right lower extremity at rest. Normal digital perfusion noted. Monophasic flow is noted in the right common femoral artery, right posterior tibial artery and right dorsalis pedis artery. Moderate disease right leg based on indices.  Left Lower PVR: Evidence of mild arterial occlusive disease in the left lower extremity at rest. Normal digital perfusion noted. Monophasic flow is noted in the left common femoral artery, left posterior tibial artery and left dorsalis pedis artery. Mild disease based on indices and waveforms.   Imaging & Doppler Findings:   RIGHT Lower PVR                Pressures Ratios  Right Posterior Tibial (Ankle) 83 mmHg   0.56  Right Dorsalis Pedis (Ankle)   99 mmHg   0.66  Right Digit (Great Toe)        94 mmHg   0.63   LEFT Lower PVR                Pressures Ratios  Left Posterior Tibial (Ankle) 101 mmHg  0.68  Left Dorsalis Pedis (Ankle)   111 mmHg  0.74  Left Digit (Great Toe)        91 mmHg   0.61                      Right     Left  Brachial Pressure 130 mmHg 149 mmHg   [id number] Dawson Petty MD, DAMIÁN  Electronically signed by [id number] DAMIÁN Wallace MD on 12/11/2024 at 9:31:11 PM  ** Final **         Assessment/Plan   Diagnoses and all orders for this visit:  PAD (peripheral artery disease) (CMS-HCC)  -     Vascular US Ankle Brachial Index (LASHAWN) Without Exercise; Future  Claudication of both lower extremities (CMS-HCC)  -     Vascular US Ankle Brachial Index (LASHAWN) Without Exercise; Future      78yo female with PAD  and calf claudication.  Recent LASHAWN/PVR study reveals significant arterial disease bilaterally.  No rest pain or tissue loss at this time.  Her claudication symptoms are tolerable.  Therefore I would not recommend any procedural intervention at this time.  She is to continue medical management with aspirin and a atorvastatin.  I have also recommended that she begin a daily walking regimen.  She is to follow-up in 6 months with repeat LASHAWN study.       (This note was generated with voice recognition software and may contain errors including spelling, grammar, syntax and missed recognition of what was dictated, of which may not have been fully corrected)        Ene Hernandez MD

## 2025-03-13 DIAGNOSIS — E11.9 TYPE 2 DIABETES MELLITUS WITHOUT COMPLICATION, WITHOUT LONG-TERM CURRENT USE OF INSULIN (MULTI): ICD-10-CM

## 2025-03-13 DIAGNOSIS — K21.00 GASTROESOPHAGEAL REFLUX DISEASE WITH ESOPHAGITIS, UNSPECIFIED WHETHER HEMORRHAGE: ICD-10-CM

## 2025-03-13 DIAGNOSIS — I10 PRIMARY HYPERTENSION: ICD-10-CM

## 2025-03-13 DIAGNOSIS — E03.8 OTHER SPECIFIED HYPOTHYROIDISM: ICD-10-CM

## 2025-03-13 RX ORDER — LEVOTHYROXINE SODIUM 50 UG/1
50 TABLET ORAL DAILY
Qty: 90 TABLET | Refills: 3 | Status: SHIPPED | OUTPATIENT
Start: 2025-03-13

## 2025-03-13 RX ORDER — GLIMEPIRIDE 4 MG/1
4 TABLET ORAL
Qty: 90 TABLET | Refills: 3 | Status: SHIPPED | OUTPATIENT
Start: 2025-03-13 | End: 2026-03-08

## 2025-03-13 RX ORDER — FAMOTIDINE 40 MG/1
40 TABLET, FILM COATED ORAL DAILY
Qty: 90 TABLET | Refills: 3 | Status: SHIPPED | OUTPATIENT
Start: 2025-03-13

## 2025-03-13 RX ORDER — TRIAMTERENE AND HYDROCHLOROTHIAZIDE 37.5; 25 MG/1; MG/1
1 CAPSULE ORAL
Qty: 90 CAPSULE | Refills: 3 | Status: SHIPPED | OUTPATIENT
Start: 2025-03-13

## 2025-03-13 NOTE — TELEPHONE ENCOUNTER
Rx Refill Request Telephone Encounter    Name:  Akanksha Ramirez  :  [id number]  Medication Name:    triamterene-hydrochlorothiazid (Dyazide) 37.5-25 mg capsule   famotidine (Pepcid) 40 mg tablet   glimepiride (AmaryL) 4 mg tablet   levothyroxine (Synthroid, Levoxyl) 50 mcg tablet     Specific Pharmacy location:  [facility]  Date of last appointment:  24  Date of next appointment:  25  Best number to reach patient:  [phone]

## 2025-03-28 LAB
ALBUMIN SERPL-MCNC: 4 G/DL (ref 3.6–5.1)
ALBUMIN/CREAT UR: 11 MG/G CREAT
ALP SERPL-CCNC: 108 U/L (ref 37–153)
ALT SERPL-CCNC: 17 U/L (ref 6–29)
ANION GAP SERPL CALCULATED.4IONS-SCNC: 9 MMOL/L (CALC) (ref 7–17)
AST SERPL-CCNC: 19 U/L (ref 10–35)
BASOPHILS # BLD AUTO: 57 CELLS/UL (ref 0–200)
BASOPHILS NFR BLD AUTO: 0.7 %
BILIRUB SERPL-MCNC: 1 MG/DL (ref 0.2–1.2)
BUN SERPL-MCNC: 29 MG/DL (ref 7–25)
CALCIUM SERPL-MCNC: 9.3 MG/DL (ref 8.6–10.4)
CHLORIDE SERPL-SCNC: 103 MMOL/L (ref 98–110)
CHOLEST SERPL-MCNC: 152 MG/DL
CHOLEST/HDLC SERPL: 3.5 (CALC)
CO2 SERPL-SCNC: 29 MMOL/L (ref 20–32)
CREAT SERPL-MCNC: 1.21 MG/DL (ref 0.6–1)
CREAT UR-MCNC: 101 MG/DL (ref 20–275)
EGFRCR SERPLBLD CKD-EPI 2021: 46 ML/MIN/1.73M2
EOSINOPHIL # BLD AUTO: 267 CELLS/UL (ref 15–500)
EOSINOPHIL NFR BLD AUTO: 3.3 %
ERYTHROCYTE [DISTWIDTH] IN BLOOD BY AUTOMATED COUNT: 12.8 % (ref 11–15)
EST. AVERAGE GLUCOSE BLD GHB EST-MCNC: 206 MG/DL
EST. AVERAGE GLUCOSE BLD GHB EST-SCNC: 11.4 MMOL/L
GLUCOSE SERPL-MCNC: 179 MG/DL (ref 65–99)
HBA1C MFR BLD: 8.8 % OF TOTAL HGB
HCT VFR BLD AUTO: 43 % (ref 35–45)
HDLC SERPL-MCNC: 44 MG/DL
HGB BLD-MCNC: 14.4 G/DL (ref 11.7–15.5)
LDLC SERPL CALC-MCNC: 80 MG/DL (CALC)
LYMPHOCYTES # BLD AUTO: 2049 CELLS/UL (ref 850–3900)
LYMPHOCYTES NFR BLD AUTO: 25.3 %
MCH RBC QN AUTO: 29.3 PG (ref 27–33)
MCHC RBC AUTO-ENTMCNC: 33.5 G/DL (ref 32–36)
MCV RBC AUTO: 87.4 FL (ref 80–100)
MICROALBUMIN UR-MCNC: 1.1 MG/DL
MONOCYTES # BLD AUTO: 559 CELLS/UL (ref 200–950)
MONOCYTES NFR BLD AUTO: 6.9 %
NEUTROPHILS # BLD AUTO: 5168 CELLS/UL (ref 1500–7800)
NEUTROPHILS NFR BLD AUTO: 63.8 %
NONHDLC SERPL-MCNC: 108 MG/DL (CALC)
PLATELET # BLD AUTO: 258 THOUSAND/UL (ref 140–400)
PMV BLD REES-ECKER: 10.5 FL (ref 7.5–12.5)
POTASSIUM SERPL-SCNC: 4.3 MMOL/L (ref 3.5–5.3)
PROT SERPL-MCNC: 6.5 G/DL (ref 6.1–8.1)
RBC # BLD AUTO: 4.92 MILLION/UL (ref 3.8–5.1)
SODIUM SERPL-SCNC: 141 MMOL/L (ref 135–146)
TRIGL SERPL-MCNC: 184 MG/DL
WBC # BLD AUTO: 8.1 THOUSAND/UL (ref 3.8–10.8)

## 2025-04-02 ENCOUNTER — APPOINTMENT (OUTPATIENT)
Dept: PRIMARY CARE | Facility: CLINIC | Age: 80
End: 2025-04-02
Payer: MEDICARE

## 2025-04-02 VITALS
WEIGHT: 140.8 LBS | HEART RATE: 78 BPM | HEIGHT: 62 IN | SYSTOLIC BLOOD PRESSURE: 134 MMHG | RESPIRATION RATE: 14 BRPM | OXYGEN SATURATION: 98 % | TEMPERATURE: 97.7 F | BODY MASS INDEX: 25.91 KG/M2 | DIASTOLIC BLOOD PRESSURE: 76 MMHG

## 2025-04-02 DIAGNOSIS — E78.2 MIXED HYPERLIPIDEMIA: ICD-10-CM

## 2025-04-02 DIAGNOSIS — N18.31 STAGE 3A CHRONIC KIDNEY DISEASE (MULTI): ICD-10-CM

## 2025-04-02 DIAGNOSIS — E11.9 TYPE 2 DIABETES MELLITUS WITHOUT COMPLICATION, WITHOUT LONG-TERM CURRENT USE OF INSULIN: ICD-10-CM

## 2025-04-02 DIAGNOSIS — Z00.00 ENCOUNTER FOR SUBSEQUENT ANNUAL WELLNESS VISIT IN MEDICARE PATIENT: ICD-10-CM

## 2025-04-02 DIAGNOSIS — Z00.00 ROUTINE GENERAL MEDICAL EXAMINATION AT HEALTH CARE FACILITY: Primary | ICD-10-CM

## 2025-04-02 DIAGNOSIS — I73.9 PAD (PERIPHERAL ARTERY DISEASE) (CMS-HCC): ICD-10-CM

## 2025-04-02 PROCEDURE — 1036F TOBACCO NON-USER: CPT | Performed by: FAMILY MEDICINE

## 2025-04-02 PROCEDURE — 3078F DIAST BP <80 MM HG: CPT | Performed by: FAMILY MEDICINE

## 2025-04-02 PROCEDURE — 3075F SYST BP GE 130 - 139MM HG: CPT | Performed by: FAMILY MEDICINE

## 2025-04-02 PROCEDURE — 1158F ADVNC CARE PLAN TLK DOCD: CPT | Performed by: FAMILY MEDICINE

## 2025-04-02 PROCEDURE — 1123F ACP DISCUSS/DSCN MKR DOCD: CPT | Performed by: FAMILY MEDICINE

## 2025-04-02 PROCEDURE — G0439 PPPS, SUBSEQ VISIT: HCPCS | Performed by: FAMILY MEDICINE

## 2025-04-02 PROCEDURE — 99214 OFFICE O/P EST MOD 30 MIN: CPT | Performed by: FAMILY MEDICINE

## 2025-04-02 PROCEDURE — 1160F RVW MEDS BY RX/DR IN RCRD: CPT | Performed by: FAMILY MEDICINE

## 2025-04-02 PROCEDURE — 1170F FXNL STATUS ASSESSED: CPT | Performed by: FAMILY MEDICINE

## 2025-04-02 PROCEDURE — 1159F MED LIST DOCD IN RCRD: CPT | Performed by: FAMILY MEDICINE

## 2025-04-02 ASSESSMENT — ENCOUNTER SYMPTOMS
OCCASIONAL FEELINGS OF UNSTEADINESS: 0
LOSS OF SENSATION IN FEET: 0
DEPRESSION: 0

## 2025-04-02 ASSESSMENT — ACTIVITIES OF DAILY LIVING (ADL)
BATHING: INDEPENDENT
GROCERY_SHOPPING: INDEPENDENT
TAKING_MEDICATION: INDEPENDENT
MANAGING_FINANCES: INDEPENDENT
DOING_HOUSEWORK: INDEPENDENT
DRESSING: INDEPENDENT

## 2025-04-02 NOTE — PROGRESS NOTES
"Subjective   Patient ID:  Akanksha Ramirez is a 79 y.o. female patient who presents today for Medicare Annual Wellness Visit Subsequent, Diabetes (Pt complains of diarrhea with the metformin and stopped taking it last week), Hypertension, Hyperlipidemia, and Chronic Kidney Disease (3a/).    Diabetes: Most recent blood work shows blood glucose 179 mg/dl and HbA1C 8.8%. Patient discontinued glimepiride due to side effects including constant diarrhea and cramping. Will adjust medication and send referral to clinical pharmacy team. Will prescribe Jardiance of Farxiga. Patient eliminated all sweets from her diet. She continues to have some fruits.     Hypertension: Blood pressure is normal.     Hyperlipidemia: Triglycerides are elevated. Cholesterol levels well controlled.     CKD 3a: Recent blood work shows eGFR of 46, improved from prior. Patient has increased water intake. Continue current regimen.     There is evidence of  arterial disease in the right leg. Some on the left as well but a little more in the right.     The patients living will is non-active. Her POA is daughter (Peggy Ramirez), back-up POA is Unknown at this time.  The patients current code status is FULL CODE. The patient does not want to linger on machines.     Objective   Vitals:  /76   Pulse 78   Temp 36.5 °C (97.7 °F)   Resp 14   Ht 1.575 m (5' 2\")   Wt 63.9 kg (140 lb 12.8 oz)   SpO2 98%   BMI 25.75 kg/m²       Physical Exam  Vitals reviewed.   Constitutional:       Appearance: Normal appearance.   Neck:      Vascular: No carotid bruit.   Cardiovascular:      Rate and Rhythm: Normal rate and regular rhythm.      Heart sounds: Normal heart sounds.      Comments: Ankle pulses are weak  Pulmonary:      Effort: Pulmonary effort is normal. No respiratory distress.      Breath sounds: Normal breath sounds. No wheezing.   Abdominal:      General: There is no distension.      Palpations: Abdomen is soft. There is no mass.      " Tenderness: There is no abdominal tenderness. There is no right CVA tenderness, left CVA tenderness, guarding or rebound.   Musculoskeletal:      Cervical back: Normal range of motion and neck supple. No rigidity.      Right lower leg: No edema.      Left lower leg: No edema.   Lymphadenopathy:      Cervical: No cervical adenopathy.   Neurological:      Mental Status: She is alert.         Labs reviewed from:   3/26/25    CMP, CBC, Lipid, HgA1C 8.8%       Assessment/Plan   Problem List Items Addressed This Visit       Stage 3a chronic kidney disease (Multi)    Current Assessment & Plan     This condition is stable, continue with current treatment.           Mixed hyperlipidemia    Current Assessment & Plan     This condition is stable, continue with current treatment.           Relevant Orders    CBC and Auto Differential    Lipid Panel    Comprehensive Metabolic Panel    Type 2 diabetes mellitus without complication, without long-term current use of insulin    Relevant Orders    Referral to Clinical Pharmacy    Hemoglobin A1C    Albumin-Creatinine Ratio, Urine Random    Follow Up In [facility] - PCP - Established    Encounter for subsequent annual wellness visit in Medicare patient    Current Assessment & Plan     Medicare wellness visit done, patient is in satisfactory living circumstances where the patient's needs are met.  This patient is advised to develop or update their living will and provide us a copy for the chart.           PAD (peripheral artery disease) (CMS-HCC)    Current Assessment & Plan     Abnormal peripheral vascular study, will need referral to vascular surgery.         Relevant Orders    Referral to Vascular Surgery     Other Visit Diagnoses       Routine general medical examination at health care facility    -  Primary    Relevant Orders    1 Year Follow Up In [facility] - PCP - Wellness Exam            Follow up in: 4 month(s) for follow-up of the above issues or sooner  if needed with labs prior.       Scribe Attestation  By signing my name below, I, Marcela Ware , Scribe attest that this documentation has been prepared under the direction and in the presence of Artie Mccallum MD.  I, Artie Mccallum MD, personally performed the services described in the documentation as scribed by Marcela Ware in my presence and confirm that it is both accurate and complete.

## 2025-04-07 ENCOUNTER — APPOINTMENT (OUTPATIENT)
Dept: PHARMACY | Facility: HOSPITAL | Age: 80
End: 2025-04-07
Payer: MEDICARE

## 2025-04-16 ENCOUNTER — APPOINTMENT (OUTPATIENT)
Dept: PHARMACY | Facility: HOSPITAL | Age: 80
End: 2025-04-16
Payer: MEDICARE

## 2025-04-30 ENCOUNTER — APPOINTMENT (OUTPATIENT)
Dept: PHARMACY | Facility: HOSPITAL | Age: 80
End: 2025-04-30
Payer: MEDICARE

## 2025-04-30 DIAGNOSIS — E11.9 TYPE 2 DIABETES MELLITUS WITHOUT COMPLICATION, WITHOUT LONG-TERM CURRENT USE OF INSULIN: ICD-10-CM

## 2025-04-30 NOTE — PROGRESS NOTES
"  Clinical Pharmacy Appointment    Patient ID: Akanksha Ramirez is a 79 y.o. female who presents for Diabetes.    Pt is here for First appointment.     Referring Provider: Artie Mccallum MD  PCP: Artie Mccallum MD  Last visit with PCP: 4/2/2025   Next visit with PCP: 7/16/2025    Subjective   Drug Interactions  No relevant drug interactions were noted.    Medication System Management  Patient's preferred pharmacy: N/A  Adherence/Organization: no concerns  Affordability/Accessibility: SGLT2 cost prohibitive  copay is based off coinsurance amount. Looks like it would be $0 copay if on metformin or insulin prior      Interval History  metformin - pt reported \"kidney pain\"     HPI  Type II Diabetes  Current  Pharmacotherapy:   Glimepiride 4 mg once daily     SECONDARY PREVENTION  - Statin? Atorvastatin 40 mg    LDL: 80   TC: 152  - ACE-I/ARB? No   UACR:   ALBUMIN/CREATININE RATIO, RANDOM URINE   Date Value Ref Range Status   03/26/2025 11 <30 mg/g creat Final     Comment:        The ADA defines abnormalities in albumin  excretion as follows:     Albuminuria Category        Result (mg/g creatinine)     Normal to Mildly increased   <30  Moderately increased            Severely increased           > OR = 300     The ADA recommends that at least two of three  specimens collected within a 3-6 month period be  abnormal before considering a patient to be  within a diagnostic category.        BP:   - Aspirin? yes    Current monitoring regimen:   Patient is using: glucometer     SMBG Fasting Readings: 150s    Hypoglycemia?      Complications:  - HEAVEN: N/A  - CKD: stage 3a    -The 10-year ASCVD risk score (Dnona HERNÁNDEZ, et al., 2019) is: 36.4%    Values used to calculate the score:      Age: 79 years      Sex: Female      Is Non- : No      Diabetic: Yes      Tobacco smoker: No      Systolic Blood Pressure: 134 mmHg      Is BP treated: No      HDL Cholesterol: 44 mg/dL      Total Cholesterol: 152 " mg/dL      Diet/Lifestyle:   eliminated all sweets from her diet. She continues to have some fruits.         Objective   Allergies[1]  Social History     Social History Narrative    Not on file      Medication Review  Current Outpatient Medications   Medication Instructions    aspirin 81 mg, Daily    atorvastatin (LIPITOR) 40 mg, oral, Daily RT    famotidine (PEPCID) 40 mg, oral, Daily    glimepiride (AMARYL) 4 mg, oral, Daily before breakfast    levothyroxine (SYNTHROID, LEVOXYL) 50 mcg, oral, Daily    triamterene-hydrochlorothiazid (Dyazide) 37.5-25 mg capsule 1 capsule, oral, Daily RT      Vitals  BP Readings from Last 2 Encounters:   04/02/25 134/76   02/05/25 134/76     BMI Readings from Last 1 Encounters:   04/02/25 25.75 kg/m²      Labs  A1C  Lab Results   Component Value Date    HGBA1C 8.8 (H) 03/26/2025    HGBA1C 7.4 (H) 11/29/2024    HGBA1C 8.0 (A) 06/21/2024     BMP  Lab Results   Component Value Date    CALCIUM 9.3 03/26/2025     03/26/2025    K 4.3 03/26/2025    CO2 29 03/26/2025     03/26/2025    BUN 29 (H) 03/26/2025    CREATININE 1.21 (H) 03/26/2025    EGFR 46 (L) 03/26/2025     LFTs  Lab Results   Component Value Date    ALT 17 03/26/2025    AST 19 03/26/2025    ALKPHOS 108 03/26/2025    BILITOT 1.0 03/26/2025     FLP  Lab Results   Component Value Date    TRIG 184 (H) 03/26/2025    CHOL 152 03/26/2025    LDLCALC 80 03/26/2025    HDL 44 (L) 03/26/2025     Urine Microalbumin  Lab Results   Component Value Date    MICROALBCREA 11 03/26/2025     Weight Management  Wt Readings from Last 3 Encounters:   04/02/25 63.9 kg (140 lb 12.8 oz)   02/05/25 63.5 kg (140 lb)   12/04/24 64.2 kg (141 lb 9.6 oz)      There is no height or weight on file to calculate BMI.     Assessment/Plan   Problem List Items Addressed This Visit       Type 2 diabetes mellitus without complication, without long-term current use of insulin    Relevant Orders    Referral to Clinical Pharmacy     Patient's goal A1c is < 7%.   Is pt at goal? No, 8.8%  Patient's SMBGs are 150-200s.     Rationale for plan: Patient uncontrolled based on recent A1c of 8.8%. Patient checks BG readings regularly. Patient tolerating current regimen. However requiring additional glycemic control. Patient has history of not tolerating metformin. Preferred next step to start Jardiance 10 mg daily but patient finds cost prohibitive. Plan to continue current regimen after discussion and follow up with more details for cost assistance for Jardiance.    Encouraged patient to continue healthy lifestyle in diet and exercise.    Medication Changes:  CONTINUE  Glimepiride 2 mg BID    Future Considerations:  Coverage for alternative medication: SGLT2 inhibitor, GLP1 or pioglitazone  Addition of SGLT2i would provide renal benefits and glycemic control    Monitoring and Education:  Discussed to take glimepiride before meals based on MOA and to avoid side effects  Addressed all questions and concerns    Clinical Pharmacist follow-up: 5/28/2025 @ 1100, Telehealth visit    Continue all meds under the continuation of care with the referring provider and clinical pharmacy team.    Farooq Cummins PharmD      Verbal consent to manage patient's drug therapy was obtained from the patient. They were informed they may decline to participate or withdraw from participation in pharmacy services at any time.         [1]   Allergies  Allergen Reactions    Iodine Unknown

## 2025-05-27 NOTE — PROGRESS NOTES
"  Clinical Pharmacy Appointment    Patient ID: Akanksha Ramirez is a 79 y.o. female who presents for Diabetes.    Pt is here for Follow Up appointment.     Referring Provider: Artie Mccallum MD  PCP: Artie Mccallum MD  Last visit with PCP: 4/2/2025   Next visit with PCP: 7/16/2025    Subjective   Drug Interactions  No relevant drug interactions were noted.    Medication System Management  Patient's preferred pharmacy: N/A  Adherence/Organization: no concerns  Affordability/Accessibility: SGLT2 cost prohibitive  copay is based off coinsurance amount. Looks like it would be $0 copay if on metformin or insulin prior      Interval History  metformin - pt reported \"kidney pain\"     HPI  Type II Diabetes  Current  Pharmacotherapy:   Glimepiride 4 mg once daily     SECONDARY PREVENTION  - Statin? Atorvastatin 40 mg    LDL: 80   TC: 152  - ACE-I/ARB? No   UACR:   ALBUMIN/CREATININE RATIO, RANDOM URINE   Date Value Ref Range Status   03/26/2025 11 <30 mg/g creat Final     Comment:        The ADA defines abnormalities in albumin  excretion as follows:     Albuminuria Category        Result (mg/g creatinine)     Normal to Mildly increased   <30  Moderately increased            Severely increased           > OR = 300     The ADA recommends that at least two of three  specimens collected within a 3-6 month period be  abnormal before considering a patient to be  within a diagnostic category.        BP:   - Aspirin? yes    Current monitoring regimen:   Patient is using: glucometer     SMBG Fasting Readings: 140-180s    Hypoglycemia? None reported      Complications:  - HEAVEN: N/A  - CKD: stage 3a    -The 10-year ASCVD risk score (Donna HERNÁNDEZ, et al., 2019) is: 36.4%    Values used to calculate the score:      Age: 79 years      Sex: Female      Is Non- : No      Diabetic: Yes      Tobacco smoker: No      Systolic Blood Pressure: 134 mmHg      Is BP treated: No      HDL Cholesterol: 44 mg/dL      " Total Cholesterol: 152 mg/dL      Diet/Lifestyle:   eliminated all sweets from her diet. She continues to have some fruits.         Objective   Allergies[1]  Social History     Social History Narrative    Not on file      Medication Review  Current Outpatient Medications   Medication Instructions    aspirin 81 mg, Daily    atorvastatin (LIPITOR) 40 mg, oral, Daily RT    empagliflozin (JARDIANCE) 10 mg, oral, Daily    famotidine (PEPCID) 40 mg, oral, Daily    glimepiride (AMARYL) 4 mg, oral, Daily before breakfast    levothyroxine (SYNTHROID, LEVOXYL) 50 mcg, oral, Daily    triamterene-hydrochlorothiazid (Dyazide) 37.5-25 mg capsule 1 capsule, oral, Daily RT      Vitals  BP Readings from Last 2 Encounters:   04/02/25 134/76   02/05/25 134/76     BMI Readings from Last 1 Encounters:   04/02/25 25.75 kg/m²      Labs  A1C  Lab Results   Component Value Date    HGBA1C 8.8 (H) 03/26/2025    HGBA1C 7.4 (H) 11/29/2024    HGBA1C 8.0 (A) 06/21/2024     BMP  Lab Results   Component Value Date    CALCIUM 9.3 03/26/2025     03/26/2025    K 4.3 03/26/2025    CO2 29 03/26/2025     03/26/2025    BUN 29 (H) 03/26/2025    CREATININE 1.21 (H) 03/26/2025    EGFR 46 (L) 03/26/2025     LFTs  Lab Results   Component Value Date    ALT 17 03/26/2025    AST 19 03/26/2025    ALKPHOS 108 03/26/2025    BILITOT 1.0 03/26/2025     FLP  Lab Results   Component Value Date    TRIG 184 (H) 03/26/2025    CHOL 152 03/26/2025    LDLCALC 80 03/26/2025    HDL 44 (L) 03/26/2025     Urine Microalbumin  Lab Results   Component Value Date    MICROALBCREA 11 03/26/2025     Weight Management  Wt Readings from Last 3 Encounters:   04/02/25 63.9 kg (140 lb 12.8 oz)   02/05/25 63.5 kg (140 lb)   12/04/24 64.2 kg (141 lb 9.6 oz)      There is no height or weight on file to calculate BMI.     Assessment/Plan   Problem List Items Addressed This Visit       Type 2 diabetes mellitus without complication, without long-term current use of insulin    Relevant  Medications    empagliflozin (Jardiance) 10 mg tablet    Other Relevant Orders    Referral to Clinical Pharmacy       Patient's goal A1c is < 7%.  Is pt at goal? No, 8.8%  Patient's SMBGs are 140-180s.     Rationale for plan: Patient uncontrolled based on recent A1c of 8.8%. Patient checks BG readings regularly. Patient tolerating current regimen. However requiring additional glycemic control. Patient has history of not tolerating metformin. Preferred next step to start Jardiance 10 mg daily but patient finds cost prohibitive.     Plan to start Jardiance 10 mg daily for additional glycemic control. Discussed MOA and side effects.    Encouraged patient to continue healthy lifestyle in diet and exercise.    Medication Changes:  CONTINUE  Glimepiride 2 mg BID    START  Jardiance 10 mg daily    Future Considerations:  Coverage for alternative medication: SGLT2 inhibitor, GLP1 or pioglitazone  Addition of SGLT2i would provide renal benefits and glycemic control    Monitoring and Education:  Discussed to take glimepiride before meals based on MOA and to avoid side effects  Addressed all questions and concerns    Clinical Pharmacist follow-up: 6/25/2025 @ 1000, Telehealth visit    Continue all meds under the continuation of care with the referring provider and clinical pharmacy team.    Farooq Cummins, PharmD      Verbal consent to manage patient's drug therapy was obtained from the patient. They were informed they may decline to participate or withdraw from participation in pharmacy services at any time.         [1]   Allergies  Allergen Reactions    Iodine Unknown

## 2025-05-28 ENCOUNTER — APPOINTMENT (OUTPATIENT)
Dept: PHARMACY | Facility: HOSPITAL | Age: 80
End: 2025-05-28
Payer: MEDICARE

## 2025-05-28 DIAGNOSIS — E11.9 TYPE 2 DIABETES MELLITUS WITHOUT COMPLICATION, WITHOUT LONG-TERM CURRENT USE OF INSULIN: ICD-10-CM

## 2025-05-28 PROCEDURE — RXMED WILLOW AMBULATORY MEDICATION CHARGE

## 2025-05-29 ENCOUNTER — PHARMACY VISIT (OUTPATIENT)
Dept: PHARMACY | Facility: CLINIC | Age: 80
End: 2025-05-29
Payer: MEDICARE

## 2025-05-29 ENCOUNTER — APPOINTMENT (OUTPATIENT)
Dept: VASCULAR SURGERY | Facility: CLINIC | Age: 80
End: 2025-05-29
Payer: MEDICARE

## 2025-06-09 DIAGNOSIS — E78.2 MIXED HYPERLIPIDEMIA: ICD-10-CM

## 2025-06-09 RX ORDER — ATORVASTATIN CALCIUM 40 MG/1
40 TABLET, FILM COATED ORAL
Qty: 90 TABLET | Refills: 3 | Status: SHIPPED | OUTPATIENT
Start: 2025-06-09

## 2025-06-09 NOTE — PROGRESS NOTES
Requested Prescriptions     Signed Prescriptions Disp Refills    atorvastatin (Lipitor) 40 mg tablet 90 tablet 3     Sig: Take 1 tablet (40 mg) by mouth once daily.

## 2025-06-16 ENCOUNTER — APPOINTMENT (OUTPATIENT)
Dept: RADIOLOGY | Facility: HOSPITAL | Age: 80
End: 2025-06-16
Payer: MEDICARE

## 2025-06-18 ENCOUNTER — APPOINTMENT (OUTPATIENT)
Dept: VASCULAR SURGERY | Facility: CLINIC | Age: 80
End: 2025-06-18
Payer: MEDICARE

## 2025-06-24 NOTE — PROGRESS NOTES
"  Clinical Pharmacy Appointment    Patient ID: Akanksha Ramirez is a 79 y.o. female who presents for Diabetes.    Pt is here for Follow Up appointment.     Referring Provider: Artie Mccallum MD  PCP: Artie Mccallum MD  Last visit with PCP: 4/2/2025   Next visit with PCP: 7/16/2025    Subjective   Drug Interactions  No relevant drug interactions were noted.    Medication System Management  Patient's preferred pharmacy: N/A  Adherence/Organization: no concerns  Affordability/Accessibility: SGLT2 cost prohibitive  copay is based off coinsurance amount. Looks like it would be $0 copay if on metformin or insulin prior      Interval History  metformin - pt reported \"kidney pain\"     HPI  Type II Diabetes  Current  Pharmacotherapy:   Glimepiride 2 mg BID  Jardiance 10 mg daily     SECONDARY PREVENTION  - Statin? Atorvastatin 40 mg    LDL: 80   TC: 152  - ACE-I/ARB? No   UACR:   ALBUMIN/CREATININE RATIO, RANDOM URINE   Date Value Ref Range Status   03/26/2025 11 <30 mg/g creat Final     Comment:        The ADA defines abnormalities in albumin  excretion as follows:     Albuminuria Category        Result (mg/g creatinine)     Normal to Mildly increased   <30  Moderately increased            Severely increased           > OR = 300     The ADA recommends that at least two of three  specimens collected within a 3-6 month period be  abnormal before considering a patient to be  within a diagnostic category.        BP:   - Aspirin? yes    Current monitoring regimen:   Patient is using: glucometer     SMBG Fasting Readings: 180-240s    Hypoglycemia? None reported      Complications:  - HEAVEN: N/A  - CKD: stage 3a    -The 10-year ASCVD risk score (Donna HERNÁNDEZ, et al., 2019) is: 45.9%    Values used to calculate the score:      Age: 79 years      Sex: Female      Is Non- : No      Diabetic: Yes      Tobacco smoker: No      Systolic Blood Pressure: 134 mmHg      Is BP treated: Yes      HDL " Cholesterol: 44 mg/dL      Total Cholesterol: 152 mg/dL      Diet/Lifestyle:   eliminated all sweets from her diet. She continues to have some fruits.   Snacking more often than consistent meals  Lean meats    Objective   Allergies[1]  Social History     Social History Narrative    Not on file      Medication Review  Current Outpatient Medications   Medication Instructions    aspirin 81 mg, Daily    atorvastatin (LIPITOR) 40 mg, oral, Daily RT    empagliflozin (JARDIANCE) 25 mg, oral, Daily    famotidine (PEPCID) 40 mg, oral, Daily    glimepiride (AMARYL) 4 mg, oral, Daily before breakfast    levothyroxine (SYNTHROID, LEVOXYL) 50 mcg, oral, Daily    triamterene-hydrochlorothiazid (Dyazide) 37.5-25 mg capsule 1 capsule, oral, Daily RT      Vitals  BP Readings from Last 2 Encounters:   04/02/25 134/76   02/05/25 134/76     BMI Readings from Last 1 Encounters:   04/02/25 25.75 kg/m²      Labs  A1C  Lab Results   Component Value Date    HGBA1C 8.8 (H) 03/26/2025    HGBA1C 7.4 (H) 11/29/2024    HGBA1C 8.0 (A) 06/21/2024     BMP  Lab Results   Component Value Date    CALCIUM 9.3 03/26/2025     03/26/2025    K 4.3 03/26/2025    CO2 29 03/26/2025     03/26/2025    BUN 29 (H) 03/26/2025    CREATININE 1.21 (H) 03/26/2025    EGFR 46 (L) 03/26/2025     LFTs  Lab Results   Component Value Date    ALT 17 03/26/2025    AST 19 03/26/2025    ALKPHOS 108 03/26/2025    BILITOT 1.0 03/26/2025     FLP  Lab Results   Component Value Date    TRIG 184 (H) 03/26/2025    CHOL 152 03/26/2025    LDLCALC 80 03/26/2025    HDL 44 (L) 03/26/2025     Urine Microalbumin  Lab Results   Component Value Date    MICROALBCREA 11 03/26/2025     Weight Management  Wt Readings from Last 3 Encounters:   04/02/25 63.9 kg (140 lb 12.8 oz)   02/05/25 63.5 kg (140 lb)   12/04/24 64.2 kg (141 lb 9.6 oz)      There is no height or weight on file to calculate BMI.     Assessment/Plan   Problem List Items Addressed This Visit       Type 2 diabetes  mellitus without complication, without long-term current use of insulin - Primary    Relevant Medications    empagliflozin (Jardiance) 25 mg tablet    Other Relevant Orders    Referral to Clinical Pharmacy     Patient's goal A1c is < 7%.  Is pt at goal? No, 8.8%  Patient's SMBGs are 180 - 240s.     Rationale for plan: Patient uncontrolled based on recent A1c of 8.8%. Patient checks BG readings regularly. Patient tolerating start of Jardiance 10 mg daily. However requiring additional glycemic control due to patient no longer taking glimepiride. Patient has history of not tolerating metformin. Preferred next step to start Jardiance 10 mg daily but patient finds cost prohibitive. Patient to apply for [facility]. Plan to provide financial documents at next PCP visit on 7/14/2025.     Plan to increase Jardiance 10 mg to 25 mg daily for additional glycemic control. Discussed MOA and side effects.    Encouraged patient to continue healthy lifestyle in diet and exercise.    Medication Changes:  INCREASE  Jardiance 10 mg to 25 mg daily (eGFR 46)    Future Considerations:  Coverage for alternative medication: SGLT2 inhibitor, GLP1 or pioglitazone  Addition of SGLT2i  provides renal benefits and glycemic control    Monitoring and Education:  Discussed to take glimepiride before meals based on MOA and to avoid side effects  Addressed all questions and concerns    Clinical Pharmacist follow-up: 7/23/2025 @ 0940, Telehealth visit    Continue all meds under the continuation of care with the referring provider and clinical pharmacy team.    Farooq Cummins, Judy      Verbal consent to manage patient's drug therapy was obtained from the patient. They were informed they may decline to participate or withdraw from participation in pharmacy services at any time.         [1]   Allergies  Allergen Reactions    Iodine Unknown

## 2025-06-25 ENCOUNTER — APPOINTMENT (OUTPATIENT)
Dept: PHARMACY | Facility: HOSPITAL | Age: 80
End: 2025-06-25
Payer: MEDICARE

## 2025-06-25 DIAGNOSIS — E11.9 TYPE 2 DIABETES MELLITUS WITHOUT COMPLICATION, WITHOUT LONG-TERM CURRENT USE OF INSULIN: Primary | ICD-10-CM

## 2025-06-25 NOTE — PROGRESS NOTES
"Subjective   Patient ID:  Akanksha Ramirez is a 79 y.o. female patient who presents today for Diabetes (Pt blood sugar has increased since starting Jardiance/), Hypertension, Hyperlipidemia, Hypothyroidism, Chronic Kidney Disease (3a), and Leg Cramps (Right leg).     Diabetes Mellitus: Reports taking medication as advised and denies side effects. The patient's is checking sugars and reports values to be in expected ranges. Checking blood sugar routinely and denies any hypoglycemic events since last visit.  Renal status has been stable.  GFR is at 42.    Hypertension, reports no side effects to prescribed medication. The patient reports good compliance with the medication. The patient is trying to follow a low-salt diet.    Hyperlipidemia: Reports taking medication as advised and without side effects.  The patient is reporting no leg cramping in particular. The patient is trying to follow a low-fat diet.       Review of Systems   All other systems reviewed and are negative.    Medications  Medications Ordered Prior to Encounter[1]      Objective   Vitals:  /62   Pulse 73   Temp 36.7 °C (98.1 °F)   Resp 16   Ht 1.575 m (5' 2\")   Wt 59.6 kg (131 lb 6.4 oz)   SpO2 98%   BMI 24.03 kg/m²       Physical Exam  Vitals reviewed.   Constitutional:       Appearance: Normal appearance.   Neck:      Vascular: No carotid bruit.     Cardiovascular:      Rate and Rhythm: Normal rate and regular rhythm.      Heart sounds: Normal heart sounds.   Pulmonary:      Effort: Pulmonary effort is normal. No respiratory distress.      Breath sounds: Normal breath sounds. No wheezing.   Abdominal:      General: There is no distension.      Palpations: Abdomen is soft. There is no mass.      Tenderness: There is no abdominal tenderness. There is no right CVA tenderness, left CVA tenderness, guarding or rebound.     Musculoskeletal:      Cervical back: Normal range of motion and neck supple. No rigidity.      Right lower leg: No " "edema.      Left lower leg: No edema.   Lymphadenopathy:      Cervical: No cervical adenopathy.     Neurological:      Mental Status: She is alert.       Labs reviewed from :  7/7/25  (Labs pending ordered 7/9/25)  HGA1c 10.1, TSH Free T4 2.45, CBC - WNL, CMP - Glucose 236, BUN 27, creatinine 1.3, EGFR 42, Lipid Panel - total cholesterol 159, HDL 44, LDL 84 triglycerides 221    Assessment/Plan   Problem List Items Addressed This Visit       Primary hypertension    Type 2 diabetes mellitus with stage 3a chronic kidney disease, without long-term current use of insulin (Multi) - Primary    Relevant Medications    insulin glargine (Lantus Solostar U-100 Insulin) 100 unit/mL (3 mL) pen    pen needle, diabetic 32 gauge x 5/32\" needle    Other Relevant Orders    Referral to Diabetes Education    Follow Up In [facility] - PCP - Established           Follow up in: 3 month(s) for follow-up of the above issues or sooner if needed with labs prior.       Scribe Attestation  By signing my name below, IMarcela Scribe attest that this documentation has been prepared under the direction and in the presence of Artie Mccallum MD.    Scribe Attestation  By signing my name below, Bethany GLASGOW Scribe   attest that this documentation has been prepared under the direction and in the presence of Artie Mccallum MD.     IArtie MD, personally performed the services described in the documentation as scribed by   Bethany Wright in my presence and confirm that it is both accurate and complete.       [1]   Current Outpatient Medications on File Prior to Visit   Medication Sig Dispense Refill    aspirin 81 mg EC tablet Take 1 tablet (81 mg) by mouth once daily.      atorvastatin (Lipitor) 40 mg tablet Take 1 tablet (40 mg) by mouth once daily. 90 tablet 3    empagliflozin (Jardiance) 25 mg tablet Take 1 tablet (25 mg) by mouth once daily. 30 tablet 11    famotidine (Pepcid) 40 mg tablet Take 1 tablet (40 " mg) by mouth once daily. 90 tablet 3    glimepiride (AmaryL) 4 mg tablet Take 1 tablet (4 mg) by mouth once daily in the morning. Take before meals. 90 tablet 3    levothyroxine (Synthroid, Levoxyl) 50 mcg tablet Take 1 tablet (50 mcg) by mouth early in the morning.. 90 tablet 3    triamterene-hydrochlorothiazid (Dyazide) 37.5-25 mg capsule Take 1 capsule by mouth once daily. 90 capsule 3     No current facility-administered medications on file prior to visit.

## 2025-07-01 PROCEDURE — RXMED WILLOW AMBULATORY MEDICATION CHARGE

## 2025-07-02 ENCOUNTER — PHARMACY VISIT (OUTPATIENT)
Dept: PHARMACY | Facility: CLINIC | Age: 80
End: 2025-07-02
Payer: MEDICARE

## 2025-07-08 LAB
ALBUMIN SERPL-MCNC: 4.5 G/DL (ref 3.6–5.1)
ALP SERPL-CCNC: 100 U/L (ref 37–153)
ALT SERPL-CCNC: 22 U/L (ref 6–29)
ANION GAP SERPL CALCULATED.4IONS-SCNC: 11 MMOL/L (CALC) (ref 7–17)
AST SERPL-CCNC: 24 U/L (ref 10–35)
BASOPHILS # BLD AUTO: 63 CELLS/UL (ref 0–200)
BASOPHILS NFR BLD AUTO: 0.8 %
BILIRUB SERPL-MCNC: 1 MG/DL (ref 0.2–1.2)
BUN SERPL-MCNC: 27 MG/DL (ref 7–25)
CALCIUM SERPL-MCNC: 9.9 MG/DL (ref 8.6–10.4)
CHLORIDE SERPL-SCNC: 99 MMOL/L (ref 98–110)
CHOLEST SERPL-MCNC: 159 MG/DL
CHOLEST/HDLC SERPL: 3.6 (CALC)
CO2 SERPL-SCNC: 31 MMOL/L (ref 20–32)
CREAT SERPL-MCNC: 1.3 MG/DL (ref 0.6–1)
EGFRCR SERPLBLD CKD-EPI 2021: 42 ML/MIN/1.73M2
EOSINOPHIL # BLD AUTO: 198 CELLS/UL (ref 15–500)
EOSINOPHIL NFR BLD AUTO: 2.5 %
ERYTHROCYTE [DISTWIDTH] IN BLOOD BY AUTOMATED COUNT: 12.5 % (ref 11–15)
EST. AVERAGE GLUCOSE BLD GHB EST-MCNC: 243 MG/DL
EST. AVERAGE GLUCOSE BLD GHB EST-SCNC: 13.5 MMOL/L
GLUCOSE SERPL-MCNC: 236 MG/DL (ref 65–99)
HBA1C MFR BLD: 10.1 %
HCT VFR BLD AUTO: 44.7 % (ref 35–45)
HDLC SERPL-MCNC: 44 MG/DL
HGB BLD-MCNC: 14.6 G/DL (ref 11.7–15.5)
LDLC SERPL CALC-MCNC: 84 MG/DL (CALC)
LYMPHOCYTES # BLD AUTO: 2007 CELLS/UL (ref 850–3900)
LYMPHOCYTES NFR BLD AUTO: 25.4 %
MCH RBC QN AUTO: 29.4 PG (ref 27–33)
MCHC RBC AUTO-ENTMCNC: 32.7 G/DL (ref 32–36)
MCV RBC AUTO: 90.1 FL (ref 80–100)
MONOCYTES # BLD AUTO: 466 CELLS/UL (ref 200–950)
MONOCYTES NFR BLD AUTO: 5.9 %
NEUTROPHILS # BLD AUTO: 5167 CELLS/UL (ref 1500–7800)
NEUTROPHILS NFR BLD AUTO: 65.4 %
NONHDLC SERPL-MCNC: 115 MG/DL (CALC)
PLATELET # BLD AUTO: 256 THOUSAND/UL (ref 140–400)
PMV BLD REES-ECKER: 10.9 FL (ref 7.5–12.5)
POTASSIUM SERPL-SCNC: 3.7 MMOL/L (ref 3.5–5.3)
PROT SERPL-MCNC: 6.8 G/DL (ref 6.1–8.1)
RBC # BLD AUTO: 4.96 MILLION/UL (ref 3.8–5.1)
SODIUM SERPL-SCNC: 141 MMOL/L (ref 135–146)
TRIGL SERPL-MCNC: 221 MG/DL
TSH SERPL-ACNC: 2.45 MIU/L (ref 0.4–4.5)
WBC # BLD AUTO: 7.9 THOUSAND/UL (ref 3.8–10.8)

## 2025-07-16 ENCOUNTER — APPOINTMENT (OUTPATIENT)
Dept: PRIMARY CARE | Facility: CLINIC | Age: 80
End: 2025-07-16
Payer: MEDICARE

## 2025-07-16 VITALS
SYSTOLIC BLOOD PRESSURE: 130 MMHG | DIASTOLIC BLOOD PRESSURE: 62 MMHG | HEIGHT: 62 IN | TEMPERATURE: 98.1 F | RESPIRATION RATE: 16 BRPM | HEART RATE: 73 BPM | BODY MASS INDEX: 24.18 KG/M2 | WEIGHT: 131.4 LBS | OXYGEN SATURATION: 98 %

## 2025-07-16 DIAGNOSIS — I10 PRIMARY HYPERTENSION: ICD-10-CM

## 2025-07-16 DIAGNOSIS — E78.2 MIXED HYPERLIPIDEMIA: ICD-10-CM

## 2025-07-16 DIAGNOSIS — E11.22 TYPE 2 DIABETES MELLITUS WITH STAGE 3A CHRONIC KIDNEY DISEASE, WITHOUT LONG-TERM CURRENT USE OF INSULIN (MULTI): Primary | ICD-10-CM

## 2025-07-16 DIAGNOSIS — N18.31 TYPE 2 DIABETES MELLITUS WITH STAGE 3A CHRONIC KIDNEY DISEASE, WITHOUT LONG-TERM CURRENT USE OF INSULIN (MULTI): Primary | ICD-10-CM

## 2025-07-16 DIAGNOSIS — E11.9 TYPE 2 DIABETES MELLITUS WITHOUT COMPLICATION, WITHOUT LONG-TERM CURRENT USE OF INSULIN: ICD-10-CM

## 2025-07-16 PROCEDURE — 99214 OFFICE O/P EST MOD 30 MIN: CPT | Performed by: FAMILY MEDICINE

## 2025-07-16 PROCEDURE — 1159F MED LIST DOCD IN RCRD: CPT | Performed by: FAMILY MEDICINE

## 2025-07-16 PROCEDURE — G2211 COMPLEX E/M VISIT ADD ON: HCPCS | Performed by: FAMILY MEDICINE

## 2025-07-16 PROCEDURE — 1160F RVW MEDS BY RX/DR IN RCRD: CPT | Performed by: FAMILY MEDICINE

## 2025-07-16 PROCEDURE — 1036F TOBACCO NON-USER: CPT | Performed by: FAMILY MEDICINE

## 2025-07-16 PROCEDURE — 3075F SYST BP GE 130 - 139MM HG: CPT | Performed by: FAMILY MEDICINE

## 2025-07-16 PROCEDURE — 3078F DIAST BP <80 MM HG: CPT | Performed by: FAMILY MEDICINE

## 2025-07-16 RX ORDER — INSULIN GLARGINE 100 [IU]/ML
10 INJECTION, SOLUTION SUBCUTANEOUS NIGHTLY
Qty: 15 ML | Refills: 1 | Status: SHIPPED | OUTPATIENT
Start: 2025-07-16 | End: 2026-07-16

## 2025-07-16 RX ORDER — PEN NEEDLE, DIABETIC 30 GX3/16"
NEEDLE, DISPOSABLE MISCELLANEOUS
Qty: 50 EACH | Refills: 3 | Status: SHIPPED | OUTPATIENT
Start: 2025-07-16

## 2025-07-16 ASSESSMENT — PATIENT HEALTH QUESTIONNAIRE - PHQ9
2. FEELING DOWN, DEPRESSED OR HOPELESS: NOT AT ALL
SUM OF ALL RESPONSES TO PHQ9 QUESTIONS 1 AND 2: 0
1. LITTLE INTEREST OR PLEASURE IN DOING THINGS: NOT AT ALL

## 2025-07-16 ASSESSMENT — ENCOUNTER SYMPTOMS
LOSS OF SENSATION IN FEET: 0
OCCASIONAL FEELINGS OF UNSTEADINESS: 0
DEPRESSION: 0

## 2025-07-17 NOTE — ASSESSMENT & PLAN NOTE
This condition is poorly controlled, therapeutic changes necessary.  Will add Lantus insulin 10 units daily.  Refer to pharmacy team.

## 2025-07-21 ENCOUNTER — HOSPITAL ENCOUNTER (OUTPATIENT)
Dept: RADIOLOGY | Facility: HOSPITAL | Age: 80
Discharge: HOME | End: 2025-07-21
Payer: MEDICARE

## 2025-07-21 DIAGNOSIS — I73.9 CLAUDICATION OF BOTH LOWER EXTREMITIES: ICD-10-CM

## 2025-07-21 DIAGNOSIS — I73.9 PAD (PERIPHERAL ARTERY DISEASE): ICD-10-CM

## 2025-07-21 PROCEDURE — 93922 UPR/L XTREMITY ART 2 LEVELS: CPT

## 2025-07-21 PROCEDURE — 93922 UPR/L XTREMITY ART 2 LEVELS: CPT | Performed by: SURGERY

## 2025-07-23 ENCOUNTER — APPOINTMENT (OUTPATIENT)
Dept: PHARMACY | Facility: HOSPITAL | Age: 80
End: 2025-07-23
Payer: MEDICARE

## 2025-07-23 DIAGNOSIS — E11.22 TYPE 2 DIABETES MELLITUS WITH STAGE 3A CHRONIC KIDNEY DISEASE, WITHOUT LONG-TERM CURRENT USE OF INSULIN (MULTI): Primary | ICD-10-CM

## 2025-07-23 DIAGNOSIS — E11.9 TYPE 2 DIABETES MELLITUS WITHOUT COMPLICATION, WITHOUT LONG-TERM CURRENT USE OF INSULIN: ICD-10-CM

## 2025-07-23 DIAGNOSIS — N18.31 TYPE 2 DIABETES MELLITUS WITH STAGE 3A CHRONIC KIDNEY DISEASE, WITHOUT LONG-TERM CURRENT USE OF INSULIN (MULTI): Primary | ICD-10-CM

## 2025-07-25 NOTE — PROGRESS NOTES
"  Clinical Pharmacy Appointment    Patient ID: Akanksha Ramirez is a 79 y.o. female who presents for Diabetes.    Pt is here for Follow Up appointment.      Referring Provider: Artie Mccallum MD (PCP)  Last visit with PCP: 7/16/25  Next visit with PCP: 10/16/25    Subjective   Interval History:  Saw PCP/labs  A1c up from previous at 10.1%  Prescribed Lantus-has not started yet  Has not started higher dose of Jardiance yet  Reports not taking Glimepiride  Is currently between homes and is living with daughter temporarily-no permanent address currently    Lifestyle Changes:  Current diet: eliminated all sweets from diet  Sticking to fruits instead of sweets  Has smaller \"snacks\" throughout the day instead of full meals  Current exercise: not asked    Glucose Monitoring Regimen:  Patient is using glucometer; testing daily    Reported SMBG Measurements:  180-240s    Any episodes of hypoglycemia? no    Adverse Effects:   Patient denies any urinary side effects (frequent urination/yeast infection/UTI)    Adherence: Good  Affordability/Accessibility  Noted in previous visits high cost for Jardiance  Should now be $0 since insulin was started    Diabetes Pharmacotherapy:    Lantus 10 units QHS  Jardiance 25 mg daily (not started yet)    Historical Diabetes Pharmacotherapy:  Metformin (renal function)  Glimepiride (patient self-discontinued)    Secondary Prevention  Statin? Yes (Atorvastatin 80 mg)  ACE-I/ARB? No  Aspirin? Yes    Health Maintenance:   Lipid Panel: LDL 84 mg/dL   UACR: 11 mg/g on 3/26/25    Drug Interactions: No significant drug-drug interactions exist that require change in therapy.    Medication System Management  Patient's preferred [facility]: [facility] ([place])    Objective     There were no vitals taken for this visit.     Labs  Lab Results   Component Value Date    BILITOT 1.0 07/07/2025    CALCIUM 9.9 07/07/2025    CO2 31 07/07/2025    CL 99 07/07/2025    CREATININE 1.30 (H) 07/07/2025 "    GLUCOSE 236 (H) 07/07/2025    ALKPHOS 100 07/07/2025    K 3.7 07/07/2025    PROT 6.8 07/07/2025     07/07/2025    AST 24 07/07/2025    ALT 22 07/07/2025    BUN 27 (H) 07/07/2025    ANIONGAP 11 07/07/2025    ALBUMIN 4.5 07/07/2025     Lab Results   Component Value Date    TRIG 221 (H) 07/07/2025    CHOL 159 07/07/2025    LDLCALC 84 07/07/2025    HDL 44 (L) 07/07/2025     Lab Results   Component Value Date    HGBA1C 10.1 (H) 07/07/2025       Medications Ordered Prior to Encounter[1]     Assessment/Plan   Problem List Items Addressed This Visit           ICD-10-CM    Type 2 diabetes mellitus with stage 3a chronic kidney disease, without long-term current use of insulin (Multi) - Primary E11.22, N18.31    Patient's goal A1c is < 7%.  Is pt at goal? No, worsening at 10.1% on 7/7/25  Patient's SMBGs are 180 - 240s.     Rationale for plan: Patient uncontrolled likely due to not being adherent to current regimen. Patient notes not taking Glimepiride-last fill of this was back in March for 90 DS. Patient has not started higher dose of Jardiance that was prescribed at last [facility] visit.     Reviewed insulin MOA and administration in detail today-patient will pick this up from pharmacy. Will resend prescription for Jardiance to [facility] as well as patient does not have permanent address for mail order. As patient is now starting insulin cost of Jardiance should be $0. Will hold off on restarting Glimepiride as patient is starting insulin.      Medication Changes:    START   Lantus 10 units at bedtime    INCREASE  Jardiance 10 mg to 25 mg daily (eGFR 46)    Future Considerations:  Titration of insulin as needed  Coverage for alternative medication: GLP1 or Pioglitazone     Monitoring and Education:  Insulin Education:  Counseled patient on Lantus MOA, expectations, side effects, duration of therapy, administration, storage and monitoring parameters.  Medication should be taken at the same time of day each day  Insulin can be  "injected under the skin to the stomach, thigh or upper arm. Rotate sites with each injection.   New needle/syringe should be utilized with each injection  Addressed all of patients questions and concerns at time of appointment. Encouraged to reach out to PharmD with additional needs.   Addressed all questions and concerns          Mala Long PharmD  Clinical Ambulatory Care Pharmacist  Ph: [phone]    Continue all meds under the continuation of care with the referring provider and clinical pharmacy team.    Clinical Pharmacist follow up: 8/13/25 or sooner as needed based on clinical intervention  Type of Encounter:  Telephone    Verbal consent to manage patient's drug therapy was obtained from the patient. They were informed they may decline to participate or withdraw from participation in pharmacy services at any time.       [1]   Current Outpatient Medications on File Prior to Visit   Medication Sig Dispense Refill    aspirin 81 mg EC tablet Take 1 tablet (81 mg) by mouth once daily.      atorvastatin (Lipitor) 40 mg tablet Take 1 tablet (40 mg) by mouth once daily. 90 tablet 3    empagliflozin (Jardiance) 25 mg tablet Take 1 tablet (25 mg) by mouth once daily. 30 tablet 11    famotidine (Pepcid) 40 mg tablet Take 1 tablet (40 mg) by mouth once daily. 90 tablet 3    glimepiride (AmaryL) 4 mg tablet Take 1 tablet (4 mg) by mouth once daily in the morning. Take before meals. 90 tablet 3    insulin glargine (Lantus Solostar U-100 Insulin) 100 unit/mL (3 mL) pen Inject 10 Units under the skin once daily at bedtime. Take as directed per insulin instructions. 15 mL 1    levothyroxine (Synthroid, Levoxyl) 50 mcg tablet Take 1 tablet (50 mcg) by mouth early in the morning.. 90 tablet 3    pen needle, diabetic 32 gauge x 5/32\" needle Use for daily injections 50 each 3    triamterene-hydrochlorothiazid (Dyazide) 37.5-25 mg capsule Take 1 capsule by mouth once daily. 90 capsule 3     No current facility-administered " medications on file prior to visit.

## 2025-07-25 NOTE — ASSESSMENT & PLAN NOTE
Patient's goal A1c is < 7%.  Is pt at goal? No, worsening at 10.1% on 7/7/25  Patient's SMBGs are 180 - 240s.     Rationale for plan: Patient uncontrolled likely due to not being adherent to current regimen. Patient notes not taking Glimepiride-last fill of this was back in March for 90 DS. Patient has not started higher dose of Jardiance that was prescribed at last [facility] visit.     Reviewed insulin MOA and administration in detail today-patient will pick this up from pharmacy. Will resend prescription for Jardiance to [facility] as well as patient does not have permanent address for mail order. As patient is now starting insulin cost of Jardiance should be $0. Will hold off on restarting Glimepiride as patient is starting insulin.      Medication Changes:    START   Lantus 10 units at bedtime    INCREASE  Jardiance 10 mg to 25 mg daily (eGFR 46)    Future Considerations:  Titration of insulin as needed  Coverage for alternative medication: GLP1 or Pioglitazone     Monitoring and Education:  Insulin Education:  Counseled patient on Lantus MOA, expectations, side effects, duration of therapy, administration, storage and monitoring parameters.  Medication should be taken at the same time of day each day  Insulin can be injected under the skin to the stomach, thigh or upper arm. Rotate sites with each injection.   New needle/syringe should be utilized with each injection  Addressed all of patients questions and concerns at time of appointment. Encouraged to reach out to PharmD with additional needs.   Addressed all questions and concerns

## 2025-07-28 ENCOUNTER — APPOINTMENT (OUTPATIENT)
Dept: NUTRITION | Facility: CLINIC | Age: 80
End: 2025-07-28
Payer: MEDICARE

## 2025-08-13 ENCOUNTER — APPOINTMENT (OUTPATIENT)
Dept: PHARMACY | Facility: HOSPITAL | Age: 80
End: 2025-08-13
Payer: MEDICARE

## 2025-08-13 DIAGNOSIS — E11.22 TYPE 2 DIABETES MELLITUS WITH STAGE 3A CHRONIC KIDNEY DISEASE, WITHOUT LONG-TERM CURRENT USE OF INSULIN (MULTI): ICD-10-CM

## 2025-08-13 DIAGNOSIS — N18.31 TYPE 2 DIABETES MELLITUS WITH STAGE 3A CHRONIC KIDNEY DISEASE, WITHOUT LONG-TERM CURRENT USE OF INSULIN (MULTI): ICD-10-CM

## 2025-08-27 ENCOUNTER — APPOINTMENT (OUTPATIENT)
Dept: PHARMACY | Facility: HOSPITAL | Age: 80
End: 2025-08-27
Payer: MEDICARE

## 2025-08-27 DIAGNOSIS — N18.31 TYPE 2 DIABETES MELLITUS WITH STAGE 3A CHRONIC KIDNEY DISEASE, WITHOUT LONG-TERM CURRENT USE OF INSULIN (MULTI): ICD-10-CM

## 2025-08-27 DIAGNOSIS — E11.22 TYPE 2 DIABETES MELLITUS WITH STAGE 3A CHRONIC KIDNEY DISEASE, WITHOUT LONG-TERM CURRENT USE OF INSULIN (MULTI): ICD-10-CM

## 2025-10-09 ENCOUNTER — APPOINTMENT (OUTPATIENT)
Dept: PHARMACY | Facility: HOSPITAL | Age: 80
End: 2025-10-09
Payer: MEDICARE

## 2025-10-16 ENCOUNTER — APPOINTMENT (OUTPATIENT)
Dept: PRIMARY CARE | Facility: CLINIC | Age: 80
End: 2025-10-16
Payer: MEDICARE

## 2026-04-03 ENCOUNTER — APPOINTMENT (OUTPATIENT)
Dept: PRIMARY CARE | Facility: CLINIC | Age: 81
End: 2026-04-03
Payer: MEDICARE